# Patient Record
Sex: MALE | Race: WHITE
[De-identification: names, ages, dates, MRNs, and addresses within clinical notes are randomized per-mention and may not be internally consistent; named-entity substitution may affect disease eponyms.]

---

## 2017-01-16 ENCOUNTER — HOSPITAL ENCOUNTER (INPATIENT)
Dept: HOSPITAL 47 - EC | Age: 72
LOS: 7 days | Discharge: NURSING, REHAB OR LONG-TERM CARE | DRG: 314 | End: 2017-01-23
Payer: MEDICARE

## 2017-01-16 VITALS — BODY MASS INDEX: 29 KG/M2

## 2017-01-16 DIAGNOSIS — T80.211A: Primary | ICD-10-CM

## 2017-01-16 DIAGNOSIS — E43: ICD-10-CM

## 2017-01-16 DIAGNOSIS — Z85.118: ICD-10-CM

## 2017-01-16 DIAGNOSIS — Z92.3: ICD-10-CM

## 2017-01-16 DIAGNOSIS — Z79.82: ICD-10-CM

## 2017-01-16 DIAGNOSIS — A41.81: ICD-10-CM

## 2017-01-16 DIAGNOSIS — E83.42: ICD-10-CM

## 2017-01-16 DIAGNOSIS — N17.9: ICD-10-CM

## 2017-01-16 DIAGNOSIS — E83.52: ICD-10-CM

## 2017-01-16 DIAGNOSIS — Z87.891: ICD-10-CM

## 2017-01-16 DIAGNOSIS — Z90.49: ICD-10-CM

## 2017-01-16 DIAGNOSIS — Z74.01: ICD-10-CM

## 2017-01-16 DIAGNOSIS — G92: ICD-10-CM

## 2017-01-16 DIAGNOSIS — L89.152: ICD-10-CM

## 2017-01-16 DIAGNOSIS — J44.9: ICD-10-CM

## 2017-01-16 DIAGNOSIS — Z85.46: ICD-10-CM

## 2017-01-16 DIAGNOSIS — R65.21: ICD-10-CM
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
## 2017-01-16 DIAGNOSIS — E87.6: ICD-10-CM

## 2017-01-16 DIAGNOSIS — J90: ICD-10-CM

## 2017-01-16 DIAGNOSIS — E87.5: ICD-10-CM

## 2017-01-16 DIAGNOSIS — D53.9: ICD-10-CM

## 2017-01-16 DIAGNOSIS — N40.0: ICD-10-CM

## 2017-01-16 DIAGNOSIS — E86.0: ICD-10-CM

## 2017-01-16 DIAGNOSIS — E87.2: ICD-10-CM

## 2017-01-16 DIAGNOSIS — K50.90: ICD-10-CM

## 2017-01-16 DIAGNOSIS — I10: ICD-10-CM

## 2017-01-16 LAB
ALP SERPL-CCNC: 133 U/L (ref 38–126)
ALT SERPL-CCNC: 67 U/L (ref 21–72)
ANION GAP SERPL CALC-SCNC: 14 MMOL/L
APTT BLD: 27.2 SEC (ref 22–30)
AST SERPL-CCNC: 54 U/L (ref 17–59)
BASOPHILS # BLD AUTO: 0 K/UL (ref 0–0.2)
BASOPHILS NFR BLD AUTO: 0 %
BUN SERPL-SCNC: 43 MG/DL (ref 9–20)
CALCIUM SPEC-MCNC: 10.3 MG/DL (ref 8.4–10.2)
CH: 29.9
CHCM: 32
CHLORIDE SERPL-SCNC: 104 MMOL/L (ref 98–107)
CK SERPL-CCNC: <20 U/L (ref 55–170)
CK SERPL-CCNC: <20 U/L (ref 55–170)
CO2 SERPL-SCNC: 21 MMOL/L (ref 22–30)
EOSINOPHIL # BLD AUTO: 0.3 K/UL (ref 0–0.7)
EOSINOPHIL NFR BLD AUTO: 1 %
ERYTHROCYTE [DISTWIDTH] IN BLOOD BY AUTOMATED COUNT: 3.32 M/UL (ref 4.3–5.9)
ERYTHROCYTE [DISTWIDTH] IN BLOOD: 16.6 % (ref 11.5–15.5)
GLUCOSE BLD-MCNC: 105 MG/DL (ref 75–99)
GLUCOSE BLD-MCNC: 140 MG/DL (ref 75–99)
GLUCOSE BLD-MCNC: 84 MG/DL (ref 75–99)
GLUCOSE BLD-MCNC: 98 MG/DL (ref 75–99)
GLUCOSE SERPL-MCNC: 122 MG/DL (ref 74–99)
HCT VFR BLD AUTO: 31.1 % (ref 39–53)
HDW: 3.2
HEMOGLOBIN A1C: 4.9 % (ref 4.2–6.1)
HGB BLD-MCNC: 10.1 GM/DL (ref 13–17.5)
INR PPP: 1.5 (ref ?–1.1)
LUC NFR BLD AUTO: 1 %
LYMPHOCYTES # SPEC AUTO: 0.4 K/UL (ref 1–4.8)
LYMPHOCYTES NFR SPEC AUTO: 2 %
MAGNESIUM SPEC-SCNC: 1.9 MG/DL (ref 1.6–2.3)
MCH RBC QN AUTO: 30.3 PG (ref 25–35)
MCHC RBC AUTO-ENTMCNC: 32.3 G/DL (ref 31–37)
MCV RBC AUTO: 93.7 FL (ref 80–100)
MONOCYTES # BLD AUTO: 0.8 K/UL (ref 0–1)
MONOCYTES NFR BLD AUTO: 4 %
NEUTROPHILS # BLD AUTO: 19.5 K/UL (ref 1.3–7.7)
NEUTROPHILS NFR BLD AUTO: 93 %
NON-AFRICAN AMERICAN GFR(MDRD): >60
PARTICLE COUNT: (no result)
PH UR: 7 [PH] (ref 5–8)
PH UR: 7.5 [PH] (ref 5–8)
PHOSPHATE SERPL-MCNC: 3.3 MG/DL (ref 2.5–4.5)
POTASSIUM SERPL-SCNC: 5.3 MMOL/L (ref 3.5–5.1)
PROT SERPL-MCNC: 7 G/DL (ref 6.3–8.2)
PROT UR QL: (no result)
PT BLD: 14.2 SEC (ref 9–12)
SODIUM SERPL-SCNC: 139 MMOL/L (ref 137–145)
SP GR UR: 1.01 (ref 1–1.03)
SP GR UR: 1.03 (ref 1–1.03)
TRIGL SERPL-MCNC: 63 MG/DL (ref ?–150)
TROPONIN I SERPL-MCNC: <0.012 NG/ML (ref 0–0.03)
TROPONIN I SERPL-MCNC: <0.012 NG/ML (ref 0–0.03)
UA BILLING (MACRO VS. MICRO): (no result)
UA BILLING (MACRO VS. MICRO): (no result)
UROBILINOGEN UR QL STRIP: <2 MG/DL (ref ?–2)
UROBILINOGEN UR QL STRIP: <2 MG/DL (ref ?–2)
WBC # BLD AUTO: 0.12 10*3/UL
WBC # BLD AUTO: 21 K/UL (ref 3.8–10.6)
WBC (PEROX): 22.08

## 2017-01-16 PROCEDURE — 86850 RBC ANTIBODY SCREEN: CPT

## 2017-01-16 PROCEDURE — 80053 COMPREHEN METABOLIC PANEL: CPT

## 2017-01-16 PROCEDURE — 83036 HEMOGLOBIN GLYCOSYLATED A1C: CPT

## 2017-01-16 PROCEDURE — 94760 N-INVAS EAR/PLS OXIMETRY 1: CPT

## 2017-01-16 PROCEDURE — 82550 ASSAY OF CK (CPK): CPT

## 2017-01-16 PROCEDURE — 82553 CREATINE MB FRACTION: CPT

## 2017-01-16 PROCEDURE — 84443 ASSAY THYROID STIM HORMONE: CPT

## 2017-01-16 PROCEDURE — 81003 URINALYSIS AUTO W/O SCOPE: CPT

## 2017-01-16 PROCEDURE — 43246 EGD PLACE GASTROSTOMY TUBE: CPT

## 2017-01-16 PROCEDURE — 82330 ASSAY OF CALCIUM: CPT

## 2017-01-16 PROCEDURE — 74177 CT ABD & PELVIS W/CONTRAST: CPT

## 2017-01-16 PROCEDURE — 83883 ASSAY NEPHELOMETRY NOT SPEC: CPT

## 2017-01-16 PROCEDURE — 82570 ASSAY OF URINE CREATININE: CPT

## 2017-01-16 PROCEDURE — 96367 TX/PROPH/DG ADDL SEQ IV INF: CPT

## 2017-01-16 PROCEDURE — 80202 ASSAY OF VANCOMYCIN: CPT

## 2017-01-16 PROCEDURE — 82306 VITAMIN D 25 HYDROXY: CPT

## 2017-01-16 PROCEDURE — 85730 THROMBOPLASTIN TIME PARTIAL: CPT

## 2017-01-16 PROCEDURE — 87186 SC STD MICRODIL/AGAR DIL: CPT

## 2017-01-16 PROCEDURE — 86900 BLOOD TYPING SEROLOGIC ABO: CPT

## 2017-01-16 PROCEDURE — 87086 URINE CULTURE/COLONY COUNT: CPT

## 2017-01-16 PROCEDURE — 82652 VIT D 1 25-DIHYDROXY: CPT

## 2017-01-16 PROCEDURE — 83970 ASSAY OF PARATHORMONE: CPT

## 2017-01-16 PROCEDURE — 93005 ELECTROCARDIOGRAM TRACING: CPT

## 2017-01-16 PROCEDURE — 87040 BLOOD CULTURE FOR BACTERIA: CPT

## 2017-01-16 PROCEDURE — 87077 CULTURE AEROBIC IDENTIFY: CPT

## 2017-01-16 PROCEDURE — 83735 ASSAY OF MAGNESIUM: CPT

## 2017-01-16 PROCEDURE — 86901 BLOOD TYPING SEROLOGIC RH(D): CPT

## 2017-01-16 PROCEDURE — 84478 ASSAY OF TRIGLYCERIDES: CPT

## 2017-01-16 PROCEDURE — 71020: CPT

## 2017-01-16 PROCEDURE — 85025 COMPLETE CBC W/AUTO DIFF WBC: CPT

## 2017-01-16 PROCEDURE — 85610 PROTHROMBIN TIME: CPT

## 2017-01-16 PROCEDURE — 84484 ASSAY OF TROPONIN QUANT: CPT

## 2017-01-16 PROCEDURE — 82040 ASSAY OF SERUM ALBUMIN: CPT

## 2017-01-16 PROCEDURE — 87070 CULTURE OTHR SPECIMN AEROBIC: CPT

## 2017-01-16 PROCEDURE — 70450 CT HEAD/BRAIN W/O DYE: CPT

## 2017-01-16 PROCEDURE — 80048 BASIC METABOLIC PNL TOTAL CA: CPT

## 2017-01-16 PROCEDURE — 83519 RIA NONANTIBODY: CPT

## 2017-01-16 PROCEDURE — 36415 COLL VENOUS BLD VENIPUNCTURE: CPT

## 2017-01-16 PROCEDURE — 96365 THER/PROPH/DIAG IV INF INIT: CPT

## 2017-01-16 PROCEDURE — 99291 CRITICAL CARE FIRST HOUR: CPT

## 2017-01-16 PROCEDURE — 81001 URINALYSIS AUTO W/SCOPE: CPT

## 2017-01-16 PROCEDURE — 83605 ASSAY OF LACTIC ACID: CPT

## 2017-01-16 PROCEDURE — 84100 ASSAY OF PHOSPHORUS: CPT

## 2017-01-16 PROCEDURE — 84132 ASSAY OF SERUM POTASSIUM: CPT

## 2017-01-16 PROCEDURE — 71010: CPT

## 2017-01-16 PROCEDURE — 84156 ASSAY OF PROTEIN URINE: CPT

## 2017-01-16 PROCEDURE — 86920 COMPATIBILITY TEST SPIN: CPT

## 2017-01-16 PROCEDURE — 99153 MOD SED SAME PHYS/QHP EA: CPT

## 2017-01-16 RX ADMIN — INSULIN LISPRO SCH: 100 INJECTION, SOLUTION INTRAVENOUS; SUBCUTANEOUS at 18:42

## 2017-01-16 RX ADMIN — CEFAZOLIN SCH MLS/HR: 330 INJECTION, POWDER, FOR SOLUTION INTRAMUSCULAR; INTRAVENOUS at 07:25

## 2017-01-16 RX ADMIN — CEFAZOLIN SCH MLS/HR: 330 INJECTION, POWDER, FOR SOLUTION INTRAMUSCULAR; INTRAVENOUS at 18:14

## 2017-01-16 RX ADMIN — DEXTROSE MONOHYDRATE SCH MLS/HR: 5 INJECTION, SOLUTION INTRAVENOUS at 12:58

## 2017-01-16 RX ADMIN — HEPARIN SODIUM SCH UNIT: 5000 INJECTION, SOLUTION INTRAVENOUS; SUBCUTANEOUS at 18:14

## 2017-01-16 RX ADMIN — NICARDIPINE HYDROCHLORIDE SCH MLS/HR: 2.5 INJECTION INTRAVENOUS at 05:28

## 2017-01-16 RX ADMIN — HEPARIN SODIUM SCH UNIT: 5000 INJECTION, SOLUTION INTRAVENOUS; SUBCUTANEOUS at 23:38

## 2017-01-16 RX ADMIN — SODIUM CHLORIDE SCH MLS/HR: 900 INJECTION, SOLUTION INTRAVENOUS at 22:46

## 2017-01-16 RX ADMIN — DEXTROSE MONOHYDRATE SCH MLS/HR: 5 INJECTION, SOLUTION INTRAVENOUS at 23:37

## 2017-01-16 RX ADMIN — INSULIN LISPRO SCH: 100 INJECTION, SOLUTION INTRAVENOUS; SUBCUTANEOUS at 11:53

## 2017-01-16 RX ADMIN — SODIUM CHLORIDE SCH MLS/HR: 9 INJECTION, SOLUTION INTRAVENOUS at 23:37

## 2017-01-16 RX ADMIN — DEXTROSE MONOHYDRATE SCH MLS/HR: 5 INJECTION, SOLUTION INTRAVENOUS at 16:15

## 2017-01-16 RX ADMIN — CEFAZOLIN SCH MLS/HR: 330 INJECTION, POWDER, FOR SOLUTION INTRAMUSCULAR; INTRAVENOUS at 23:42

## 2017-01-16 RX ADMIN — PANTOPRAZOLE SODIUM SCH MG: 40 INJECTION, POWDER, FOR SOLUTION INTRAVENOUS at 08:47

## 2017-01-16 RX ADMIN — NICARDIPINE HYDROCHLORIDE SCH MLS/HR: 2.5 INJECTION INTRAVENOUS at 05:38

## 2017-01-16 NOTE — XR
EXAMINATION TYPE: XR chest 2V

 

DATE OF EXAM: 1/16/2017 6:03 AM

 

COMPARISON: 12/13/2016 

HISTORY: Fever

 

TECHNIQUE:  Frontal and lateral views of the chest are obtained.

 

FINDINGS:  There appears to be some pleural thickening on the left posterior chest wall. There is no 
heart failure. There is a right central venous catheter with tip in the superior vena cava. Right ceasar
g is clear.

 

IMPRESSION:  Left pleural effusion is the same or improved compared to last exam. Osteopenia.

## 2017-01-16 NOTE — CT
EXAMINATION TYPE: CT abdomen pelvis w con

 

DATE OF EXAM: 1/16/2017 6:20 AM

 

COMPARISON: 11/29/2016

 

HISTORY: Fever

 

CT DLP: 570.50 mGycm

Automated exposure control for dose reduction was used.

 

TECHNIQUE:  Helical acquisition of images was performed from the lung bases through the pelvis.

 

CONTRAST: 

Performed without Oral Contrast and with IV Contrast, patient injected with 100 mL of Omnipaque 300.

 

FINDINGS: 

 

Multiple axial sections were obtained from the diaphragm to the floor the pelvis with intravenous con
trast.: 

 

There is a left pleural effusion. The right lung base is clear.

 

Liver spleen pancreas appear normal. Bile ducts are not dilated. Gallbladder is contracted.

 

There is an ileostomy in the right midabdomen. There is some skin deformity of the left mid abdomen c
onsistent with previous stoma. There is no evidence of bowel obstruction. There is increased density 
in the dependent urinary bladder that could be debris.

 

There is no retroperitoneal adenopathy.

 

Kidneys show satisfactory contrast opacification is no hydronephrosis. There is no adrenal mass.

 

IMPRESSION: 

THERE IS CHRONIC LEFT PLEURAL EFFUSION WITHOUT CHANGE COMPARED TO OLD EXAM. THERE IS CLEARING OF RIGH
T PLEURAL FLUID COMPARED TO LAST EXAM.

 

THERE IS AN ILEOSTOMY IN THE RIGHT MID ABDOMEN THAT IS NEW COMPARED TO THE OLD EXAM. NO BOWEL OBSTRUC
TION. THERE IS REMOVAL OF THE APPARENT COLOSTOMY IN THE LEFT MID ABDOMEN COMPARED TO OLD EXAM.

 

THERE ARE MILD OSTEOPOROTIC TYPE COMPRESSION FRACTURES OF THE LUMBAR SPINE AT L2 T12 LEVELS. THIS IS 
UNCHANGED.

 

MILD INCREASED DENSITY IN THE DEPENDENT URINARY BLADDER COULD BE HEMORRHAGE OR DEBRIS AND SHOULD BE C
ORRELATED CLINICALLY.

 

THE DELAYED IMAGES OF THE KIDNEYS DO NOT SHOW ANY SIGNIFICANT CONTRAST IN THE COLLECTING SYSTEMS AND 
THIS MAY INDICATE RENAL FAILURE.
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
## 2017-01-16 NOTE — XR
EXAMINATION TYPE: XR chest 1V portable

 

DATE OF EXAM: 1/16/2017 5:58 PM

 

COMPARISON: Today

 

HISTORY: Line placement

 

TECHNIQUE: Single frontal view of the chest is obtained.

 

FINDINGS:  There is a right jugular catheter with the tip in the right atrium. There is no pneumothor
ax. Heart size is normal. There is no heart failure.

 

IMPRESSION:  Right jugular catheter appears in good position.

## 2017-01-16 NOTE — CT
EXAMINATION TYPE: CT brain wo con

 

DATE OF EXAM: 1/16/2017 7:49 AM

 

HISTORY: Change in mental status

 

CT DLP: 1012.70 mGycm.  Automated Exposure Control for Dose Reduction was Utilized.

 

TECHNIQUE: CT scan of the head is performed without contrast.

 

COMPARISON: CT brain December 13, 2016

 

FINDINGS:   There is no acute intracranial hemorrhage or midline shift identified. There is diffuse v
entricular and sulcal prominence consistent with diffuse age-related cerebral atrophy.  There is low-
attenuation in the periventricular white matter consistent with chronic small vessel ischemic change.
 Slightly hyperdense vascular structures as well as filling draining venous sinuses are product of re
cent IV contrast injection for abdominal CT performed earlier today. Some scleral calcifications are 
present in both globes. Paranasal sinuses are clear.

 

IMPRESSION:  No acute intracranial hemorrhage or midline shift.  There is moderate diffuse age-relate
d cerebral atrophy and chronic small vessel ischemic change redemonstrated. No significant change fro
m prior CT is identified.

## 2017-01-16 NOTE — P.PCN
Date of Procedure: 01/16/17


Preoperative Diagnosis: 


Septic shock, hemodynamic instability


Postoperative Diagnosis: 


same


Procedure(s) Performed: 


right radial arterial line


Anesthesia: local


Surgeon: Tasha Bryant


Pathology: none sent


Condition: critical


Disposition: ICU


Indications for Procedure: 


Septic shock, hemodynamic monitoring, need for vasopressors


Description of Procedure: 


Alans test performed.  Local anesthesia utilized.  Skin cleaned with 

chloraprep.  Pulse palpated and needle inserted into artery with return of 

bright red pulsatile blood.  Guide wire fed and catheter fed over guide wire.  

Catheter sutured into place.  Patient tolerated procedure well.
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
## 2017-01-17 LAB
ALP SERPL-CCNC: 108 U/L (ref 38–126)
ALT SERPL-CCNC: 51 U/L (ref 21–72)
ANION GAP SERPL CALC-SCNC: 8 MMOL/L
AST SERPL-CCNC: 27 U/L (ref 17–59)
BASOPHILS # BLD AUTO: 0 K/UL (ref 0–0.2)
BASOPHILS NFR BLD AUTO: 0 %
BUN SERPL-SCNC: 28 MG/DL (ref 9–20)
CALCIUM SPEC-MCNC: 8.7 MG/DL (ref 8.4–10.2)
CH: 29.1
CHCM: 29.5
CHLORIDE SERPL-SCNC: 116 MMOL/L (ref 98–107)
CO2 SERPL-SCNC: 18 MMOL/L (ref 22–30)
EOSINOPHIL # BLD AUTO: 0 K/UL (ref 0–0.7)
EOSINOPHIL NFR BLD AUTO: 0 %
ERYTHROCYTE [DISTWIDTH] IN BLOOD BY AUTOMATED COUNT: 2.3 M/UL (ref 4.3–5.9)
ERYTHROCYTE [DISTWIDTH] IN BLOOD: 16.4 % (ref 11.5–15.5)
GLUCOSE BLD-MCNC: 79 MG/DL (ref 75–99)
GLUCOSE BLD-MCNC: 86 MG/DL (ref 75–99)
GLUCOSE BLD-MCNC: 88 MG/DL (ref 75–99)
GLUCOSE BLD-MCNC: 97 MG/DL (ref 75–99)
GLUCOSE SERPL-MCNC: 71 MG/DL (ref 74–99)
HCT VFR BLD AUTO: 22.8 % (ref 39–53)
HDW: 3.07
HGB BLD-MCNC: 6.8 GM/DL (ref 13–17.5)
LUC NFR BLD AUTO: 2 %
LYMPHOCYTES # SPEC AUTO: 0.5 K/UL (ref 1–4.8)
LYMPHOCYTES NFR SPEC AUTO: 5 %
MAGNESIUM SPEC-SCNC: 1.8 MG/DL (ref 1.6–2.3)
MCH RBC QN AUTO: 29.5 PG (ref 25–35)
MCHC RBC AUTO-ENTMCNC: 29.8 G/DL (ref 31–37)
MCV RBC AUTO: 99 FL (ref 80–100)
MONOCYTES # BLD AUTO: 0.7 K/UL (ref 0–1)
MONOCYTES NFR BLD AUTO: 7 %
NEUTROPHILS # BLD AUTO: 8.9 K/UL (ref 1.3–7.7)
NEUTROPHILS NFR BLD AUTO: 87 %
NON-AFRICAN AMERICAN GFR(MDRD): >60
PHOSPHATE SERPL-MCNC: 3.9 MG/DL (ref 2.5–4.5)
POTASSIUM SERPL-SCNC: 3.9 MMOL/L (ref 3.5–5.1)
PROT SERPL-MCNC: 4.4 G/DL (ref 6.3–8.2)
SODIUM SERPL-SCNC: 142 MMOL/L (ref 137–145)
WBC # BLD AUTO: 0.2 10*3/UL
WBC # BLD AUTO: 10.3 K/UL (ref 3.8–10.6)
WBC (PEROX): 10.59

## 2017-01-17 RX ADMIN — INSULIN LISPRO SCH: 100 INJECTION, SOLUTION INTRAVENOUS; SUBCUTANEOUS at 00:15

## 2017-01-17 RX ADMIN — INSULIN LISPRO SCH: 100 INJECTION, SOLUTION INTRAVENOUS; SUBCUTANEOUS at 05:54

## 2017-01-17 RX ADMIN — CEFAZOLIN SCH: 330 INJECTION, POWDER, FOR SOLUTION INTRAMUSCULAR; INTRAVENOUS at 11:12

## 2017-01-17 RX ADMIN — PANTOPRAZOLE SODIUM SCH MG: 40 INJECTION, POWDER, FOR SOLUTION INTRAVENOUS at 08:08

## 2017-01-17 RX ADMIN — DEXTROSE MONOHYDRATE SCH MLS/HR: 5 INJECTION, SOLUTION INTRAVENOUS at 23:17

## 2017-01-17 RX ADMIN — SODIUM CHLORIDE SCH MLS/HR: 9 INJECTION, SOLUTION INTRAVENOUS at 07:54

## 2017-01-17 RX ADMIN — INSULIN LISPRO SCH: 100 INJECTION, SOLUTION INTRAVENOUS; SUBCUTANEOUS at 12:08

## 2017-01-17 RX ADMIN — CEFAZOLIN SCH: 330 INJECTION, POWDER, FOR SOLUTION INTRAMUSCULAR; INTRAVENOUS at 11:13

## 2017-01-17 RX ADMIN — SODIUM CHLORIDE SCH MLS/HR: 9 INJECTION, SOLUTION INTRAVENOUS at 08:53

## 2017-01-17 RX ADMIN — HEPARIN SODIUM SCH UNIT: 5000 INJECTION, SOLUTION INTRAVENOUS; SUBCUTANEOUS at 23:17

## 2017-01-17 RX ADMIN — MAGNESIUM SULFATE IN DEXTROSE SCH MLS/HR: 10 INJECTION, SOLUTION INTRAVENOUS at 08:53

## 2017-01-17 RX ADMIN — MAGNESIUM SULFATE IN DEXTROSE SCH MLS/HR: 10 INJECTION, SOLUTION INTRAVENOUS at 07:51

## 2017-01-17 RX ADMIN — SODIUM CHLORIDE SCH MLS/HR: 9 INJECTION, SOLUTION INTRAVENOUS at 15:39

## 2017-01-17 RX ADMIN — CEFAZOLIN SCH MLS/HR: 330 INJECTION, POWDER, FOR SOLUTION INTRAMUSCULAR; INTRAVENOUS at 23:17

## 2017-01-17 RX ADMIN — DEXTROSE MONOHYDRATE SCH MLS/HR: 5 INJECTION, SOLUTION INTRAVENOUS at 08:07

## 2017-01-17 RX ADMIN — INSULIN LISPRO SCH: 100 INJECTION, SOLUTION INTRAVENOUS; SUBCUTANEOUS at 17:54

## 2017-01-17 RX ADMIN — CEFAZOLIN SCH MLS/HR: 330 INJECTION, POWDER, FOR SOLUTION INTRAMUSCULAR; INTRAVENOUS at 08:08

## 2017-01-17 RX ADMIN — HEPARIN SODIUM SCH UNIT: 5000 INJECTION, SOLUTION INTRAVENOUS; SUBCUTANEOUS at 08:08

## 2017-01-17 RX ADMIN — DEXTROSE MONOHYDRATE SCH MLS/HR: 5 INJECTION, SOLUTION INTRAVENOUS at 15:39

## 2017-01-17 RX ADMIN — CEFAZOLIN SCH MLS/HR: 330 INJECTION, POWDER, FOR SOLUTION INTRAMUSCULAR; INTRAVENOUS at 16:59

## 2017-01-17 RX ADMIN — HEPARIN SODIUM SCH UNIT: 5000 INJECTION, SOLUTION INTRAVENOUS; SUBCUTANEOUS at 15:34

## 2017-01-17 RX ADMIN — SODIUM CHLORIDE SCH MLS/HR: 900 INJECTION, SOLUTION INTRAVENOUS at 21:04

## 2017-01-17 NOTE — CONS
DATE OF CONSULTATION:  2017 



Reason for consultation is sepsis. 



HISTORY OF PRESENT ILLNESS: The patient is a 71-year-old  

male, well known to my service. The patient recently did have MSSA and 

bacteremia for which the patient did have extensive work-up including 

a GALO, a chest CT, CT of the abdomen and pelvis.  Only positive 

finding was severe colitis with ulcerative colitis exacerbation 

failing medical therapy.  Patient ended up having a subtotal colectomy 

and ileostomy formation. The patient also had an episode of candidemia 

thought to be related to his abdominal source.  followup blood 

cultures were negative. Initially identified as candida famata that 

was later switched to Candida tropicalis. The patient was discharged 

to select specialty and I did have a discussion with that physician 

and suggested the patient should get the p.o. Diflucan for another 

week or 10 days to finish a course of therapy.  The patient ended up 

getting a PICC line at that facility and apparently has been treated 

with the TPN. [place] [facility] 

[place] has been there for about 10 

days now.  Patient was  sent to the [facility] last 

night with the chief complaints of fever just prior to arrival to the 

hospital.  Patient did receive dose of Tylenol before coming to the 

hospital. The patient was subsequently evaluated by the ER physician.  

He did have a chest x-ray negative for any pneumonia. He did have a CT 

of abdomen and pelvis which shows chronic left pleural effusion, 

ileostomy, lumbar spine but no evidence of 

an abscess. The patient did have UA that has been negative. The 

patient was hypotensive, requiring multiple fluid boluses. The patient 

was started on the IV antibiotics and was admitted to the ICU and I was asked 

to see the patient for further recommendation regarding antibiotic 

therapy.  Patient denies any significant headache or URI symptoms. 

Denies significant chest pain. Occasional cough. No significant 

abdominal pain. Did have some liquid stool in the ileostomy bag but no 

worsening and no burning or frequency of urine.  



REVIEW OF SYSTEMS: 

CONSTITUTIONAL: Positive for weakness and fever. 

EYES: No complaint. 

ENT: No complaint. 

RESPIRATORY: No aspiration. 

CARDIOVASCULAR:  No complaint.

GENITOURINARY:  No complaint. 

GASTROINTESTINAL:  No complaint.

MUSCULOSKELETAL:  No complaint.

INTEGUMENTARY:  No complaint. 

PSYCHOLOGIC: No complaint. 

ENDOCRINE: No complaint. 

NEUROLOGIC:  No complaint.  



PAST MEDICAL HISTORY: Significant for ulcerative colitis, COPD, 

prostate cancer,  PICC line related to candidemia and MSSA bacteremia. 

 



PAST SURGICAL HISTORY: Adenoidectomy, appendectomy, cholecystectomy, 

hernia repair,  

Tonsillectomy and subtotal colectomy, left upper lobe infection for 

the lung cancer.  



SOCIAL HISTORY: Remote history of smoking, quit back in 2015. 

Did have a history of heavy drinking and quit 10 years ago. No drug 

use.  



FAMILY HISTORY: Father with history of osteoarthritis. Mother with a 

history of dementia,  at the age of 82.   



ALLERGIES: No known drug allergies. 



Medications currently include the patient is on heparin, Humalog, 

Narcan, norepinephrine, Protonix, vanco and Levaquin.  



On examination, blood pressure 94/54 with a pulse of 113, temperature 

of 98.2, T-max of 102.6. He is 98% on room air.  

General description is an elderly male, lying in bed in no distress. 

No tachypnea or accessory muscle for respiration use.  

HEENT examination shows pallor. There is no scleral icterus.  Oral 

mucous membranes dry.  

NECK: Trachea central, though no thyromegaly. 

LUNGS: Unlabored breathing. Some decreased breath sounds at the base. 

No wheeze.  

HEART: S1, S2, tachycardic. 

ABDOMEN: Soft, no tenderness. No guarding or rigidity.  Ileostomy with 

some liquidy stool, no blood. 

EXTREMITIES: No edema of the feet. 

SKIN EXAMINATION: No rash or mass palpable. 

NEUROLOGICAL:  Patient is awake, alert, oriented x2. Mood and affect 

normal.  



LABS: Hemoglobin is 10.1, white count of 21,000. BUN of 43, creatinine 

1.02, potassium is 5.3, lactic acid 2.6, liver enzymes are normal. UA 

has been negative. Chest x-rays were negative for any pneumonia.  



DIAGNOSTIC IMPRESSION AND PLAN: Patient with sepsis in a patient who 

did have fever of 102.6 with hypotension requiring multiple fluid 

boluses, currently on the pressor support.  Elevated lactic acid, 

source is likely a PICC line infection which apparently has been 

placed at the [facility] hospital and no other clinical focus of 

infection. Chest x-ray negative for any pneumonia.  CT abd is negative 

for any abscess and UA has been negative in a patient who did have 

previous history of methicillin-susceptible Staphylococcus aureus 

bacteremia as well as candida.  



PLAN:  

1. Discontinue the PICC line and send tip for culture.

2. Vancomycin to continue, add Zosyn for the gram-negative and will 

add micafungin as the patient has been exposed to the fluconazole 

3. Will follow up on the clinical condition and cultures to further 

adjust the medication as needed.  



Thank you for this consultation. Will follow this patient along with 

you.  



NICOLLE

## 2017-01-17 NOTE — P.PN
Subjective


Principal diagnosis: 





Septic shock





Patient seen and examined in the ICU with nursing staff at bedside. Patient 

states he is feeling much better today. He denies any chest pain or shortness 

of breath. The patient has been off of pressors since 1 AM. He has adequate 

urine output and greater than 50 mL an hour. His current CVP is 8. The patient'

s hemoglobin did drop to 6.8. He is currently receiving 1 unit of packed red 

blood cells. He has not been febrile overnight.





Objective





- Vital Signs


Vital signs: 


 Vital Signs











Temp  97.7 F   01/17/17 16:00


 


Pulse  95   01/17/17 16:00


 


Resp  19   01/17/17 16:00


 


BP  116/65   01/17/17 16:00


 


Pulse Ox  99   01/17/17 16:00








 Intake & Output











 01/16/17 01/17/17 01/17/17





 18:59 06:59 18:59


 


Intake Total 3363.293 2533.355 2585.0


 


Output Total 545 1340 725


 


Balance 2818.293 5730.012 2888.0


 


Weight 81.647 kg 71.6 kg 


 


Intake:   


 


  IV 3125.0 2380.0 1160.0


 


    Fluconazole in NaCl,Iso- 200  





    Osm 400 mg In Saline 200   





    200ml.bag @ 100 mls/hr   





    IVPB DAILY MAYA Rx#:   





    [id number]   


 


    Piperacillin-Tazobactam 3 75.0 50.0 50.0





    .375 gm In Dextrose/Water   





    1 50ml.bag @ 12.5 mls/hr   





    IVPB Q8HR MAYA Rx#:   





    [id number]   


 


    Sodium Chloride 0.9% 1, 600 1080 1110





    000 ml @ 150 mls/hr IV .   





    Q6H40M MAYA Rx#:[id number]   


 


    Sodium Chloride 0.9% 1, 2000  





    000 ml @ 999 mls/hr IV .   





    Q1H1M ONE Rx#:[id number]   


 


    Sodium Chloride 0.9% 1,  1000 





    000 ml @ 999 mls/hr IV .   





    Q1H1M STA Rx#:[id number]   


 


    Vancomycin 1,500 mg In 250 250 





    Sodium Chloride 0.9% 250   





    ml @ 125 mls/hr IVPB ONCE   





    ONE Rx#:[id number]   


 


  Intake, IV Titration 2.293 143.355 400





  Amount   


 


    Magnesium Sulfate-D5w Pmx   200





    1 gm In Dextrose/Water 1   





    100ml.bag @ 100 mls/hr   





    IVPB Q1H MAYA Rx#:   





    [id number]   


 


    Micafungin 100 mg In  100 





    Sodium Chloride 0.9% 100   





    ml @ 100 mls/hr IVPB Q24H   





    MAYA Rx#:[id number]   


 


    Norepinephrine 16 mg In 2.293 43.355 





    Sodium Chloride 0.9% 250   





    ml @ Titrate IV .Q0M MAYA   





    Rx#:[id number]   


 


    Potassium Chloride 10 meq   200





    Lidocaine 2% Inj 10 mg   





    In Sodium Chloride 0.9%   





    100 ml @ 100 mls/hr IV   





    Q1HR MAYA Rx#:[id number]   


 


  Oral 236  325


 


  Tube Feeding  10 


 


  Blood Product   700


 


    Rc Cpda-1  Unit   250





    [id number]   


 


Output:   


 


  Urine 545 640 525


 


  Stool  700 200


 


Other:   


 


  Voiding Method Indwelling Catheter Indwelling Catheter Indwelling Catheter








 ABP, PAP, CO, CI - Last Documented











Arterial Blood Pressure        93/39

















- Exam





Gen.: Patient is alert however he is somewhat confused


Cardiovascular: Regular rate and rhythm, S1/S2


Lungs: Diminished at the bases otherwise clear


Abdomen: Soft nontender nondistended positive bowel sounds, colostomy in place


Extremities: No edema





- Labs


CBC & Chem 7: 


 01/17/17 04:09





 01/17/17 04:09


Labs: 


 Abnormal Lab Results - Last 24 Hours (Table)











  01/17/17 01/17/17 01/17/17 Range/Units





  04:09 04:09 05:35 


 


RBC  2.30 L    (4.30-5.90)  m/uL


 


Hgb  6.8 L* D    (13.0-17.5)  gm/dL


 


Hct  22.8 L    (39.0-53.0)  %


 


MCHC  29.8 L    (31.0-37.0)  g/dL


 


RDW  16.4 H    (11.5-15.5)  %


 


Neutrophils #  8.9 H    (1.3-7.7)  k/uL


 


Lymphocytes #  0.5 L    (1.0-4.8)  k/uL


 


Chloride   116 H   ()  mmol/L


 


Carbon Dioxide   18 L   (22-30)  mmol/L


 


BUN   28 H   (9-20)  mg/dL


 


Glucose   71 L   (74-99)  mg/dL


 


Ionized Calcium Alexis   6.0 H*  6.1 H*  (4.5-5.3)  mg/dL


 


Total Protein   4.4 L   (6.3-8.2)  g/dL


 


Albumin   1.9 L   (3.5-5.0)  g/dL


 


Crossmatch     














  01/17/17 Range/Units





  07:15 


 


RBC   (4.30-5.90)  m/uL


 


Hgb   (13.0-17.5)  gm/dL


 


Hct   (39.0-53.0)  %


 


MCHC   (31.0-37.0)  g/dL


 


RDW   (11.5-15.5)  %


 


Neutrophils #   (1.3-7.7)  k/uL


 


Lymphocytes #   (1.0-4.8)  k/uL


 


Chloride   ()  mmol/L


 


Carbon Dioxide   (22-30)  mmol/L


 


BUN   (9-20)  mg/dL


 


Glucose   (74-99)  mg/dL


 


Ionized Calcium Alexis   (4.5-5.3)  mg/dL


 


Total Protein   (6.3-8.2)  g/dL


 


Albumin   (3.5-5.0)  g/dL


 


Crossmatch  See Detail  








 Microbiology - Last 24 Hours (Table)











 01/16/17 10:31 Blood Culture Gram Stain - Preliminary





 Blood Blood Culture - Preliminary





    Group D Enterococcus


 


 01/16/17 13:45 Catheter Tip Culture - Preliminary





 Picc Line    Group D Enterococcus


 


 01/16/17 10:31 Blood Culture - Preliminary





 Blood 


 


 01/16/17 11:35 Urine Culture - Preliminary





 Urine,Catheterized 














Assessment and Plan


Plan: 





Septic shock


Bacteremia with enterococcus


Recent fungemia and bacteremia, suspect recurrence


Toxic metabolic encephalopathy


History of Crohn's disease status post colectomy/ileostomy


History of lung cancer status post resection


Anemia


Hypercalcemia


Mild hyperkalemia


Mild non-anion gap metabolic acidosis


Acute kidney injury


Severe protein calorie malnutrition requiring TPN


Generalized weakness


Dehydration


History of upper GI bleed


History of compression fractures





O2 to maintain saturation greater than equal to 88%


IV fluid resuscitation


Remove PICC line per infectious disease


Consult dietitian for PPN


Encourage enteral nutrition


Monitor electrolytes


Place Gordillo catheter


GI and DVT prophylaxis


Antibiotics: Vanco, Zosyn


Antifungals: Fluconazole


Blood, urine cultures - Per nursing gordillo placed and repeat urine culture as 

the urine is turbid


Follow chest x-ray


Transfuse 1 unit of packed red blood cells


Nephrology consult 


Consult PT and OT


if patient's nutrition intake doesn't improve the patient may need PEG tube 

placement. This is discussed with his wife who is agreeable.
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
## 2017-01-17 NOTE — PN
DATE OF SERVICE: 01/17/2017



REASON FOR FOLLOWUP: Sepsis, line-related, and bacteremia. 



INTERVAL HISTORY: The patient is afebrile. He is hemodynamically 

stable and has been taken off the pressors since 1:00 last night. The 

patient denies having any chest pain, cough. No abdominal pain or any 

nausea, vomiting.  



On examination, blood pressure is 118/66 with pulse of 89, temperature 

97.7. He is 100% on room air. General description is an elderly male, 

lying in bed in no distress.  

RESPIRATORY SYSTEM: Unlabored breathing. Clear to auscultation 

anteriorly.  

HEART: S1, S2 regular. 

ABDOMEN: Soft. No tenderness.



LABS: Hemoglobin is 6.8, white count 10 0.3 with a BUN of 28, 

creatinine 1.00. Blood culture showing Enterococcus species. Catheter 

with Enterococcus.  



DIAGNOSTIC IMPRESSION AND PLAN: Patient with sepsis. Source is 

line-related. Percutaneously-inserted central catheter line has been 

discontinued. Catheter as well as blood culture showing a group D 

Enterococcus. Will wait for the sensitivity testing before any 

further adjustment in antibiotics and if no yeast is grown, will discontinue 
micafungin. Overall 

prognosis continues to be guarded. Continue with supportive care.  



MTDD

## 2017-01-18 LAB
1,25(OH)2D3 SERPL-MCNC: 11 PG/ML (ref 20–79)
ANION GAP SERPL CALC-SCNC: 8 MMOL/L
BASOPHILS # BLD AUTO: 0 K/UL (ref 0–0.2)
BASOPHILS NFR BLD AUTO: 0 %
BUN SERPL-SCNC: 16 MG/DL (ref 9–20)
CALCIUM SPEC-MCNC: 8.8 MG/DL (ref 8.4–10.2)
CH: 29.3
CHCM: 30.3
CHLORIDE SERPL-SCNC: 117 MMOL/L (ref 98–107)
CO2 SERPL-SCNC: 17 MMOL/L (ref 22–30)
EOSINOPHIL # BLD AUTO: 0.2 K/UL (ref 0–0.7)
EOSINOPHIL NFR BLD AUTO: 3 %
ERYTHROCYTE [DISTWIDTH] IN BLOOD BY AUTOMATED COUNT: 2.84 M/UL (ref 4.3–5.9)
ERYTHROCYTE [DISTWIDTH] IN BLOOD: 16.2 % (ref 11.5–15.5)
GLUCOSE BLD-MCNC: 103 MG/DL (ref 75–99)
GLUCOSE BLD-MCNC: 110 MG/DL (ref 75–99)
GLUCOSE BLD-MCNC: 79 MG/DL (ref 75–99)
GLUCOSE BLD-MCNC: 91 MG/DL (ref 75–99)
GLUCOSE SERPL-MCNC: 80 MG/DL (ref 74–99)
HCT VFR BLD AUTO: 27.7 % (ref 39–53)
HDW: 3.52
HGB BLD-MCNC: 8.6 GM/DL (ref 13–17.5)
KAPPA LC FREE SER-MCNC: 4.71 MG/DL (ref 0.33–1.94)
LUC NFR BLD AUTO: 4 %
LYMPHOCYTES # SPEC AUTO: 0.7 K/UL (ref 1–4.8)
LYMPHOCYTES NFR SPEC AUTO: 11 %
MAGNESIUM SPEC-SCNC: 1.9 MG/DL (ref 1.6–2.3)
MCH RBC QN AUTO: 30.2 PG (ref 25–35)
MCHC RBC AUTO-ENTMCNC: 31 G/DL (ref 31–37)
MCV RBC AUTO: 97.4 FL (ref 80–100)
MONOCYTES # BLD AUTO: 0.5 K/UL (ref 0–1)
MONOCYTES NFR BLD AUTO: 9 %
NEUTROPHILS # BLD AUTO: 4.5 K/UL (ref 1.3–7.7)
NEUTROPHILS NFR BLD AUTO: 73 %
NON-AFRICAN AMERICAN GFR(MDRD): >60
PHOSPHATE SERPL-MCNC: 3.6 MG/DL (ref 2.5–4.5)
POTASSIUM SERPL-SCNC: 3.4 MMOL/L (ref 3.5–5.1)
SODIUM SERPL-SCNC: 142 MMOL/L (ref 137–145)
WBC # BLD AUTO: 0.25 10*3/UL
WBC # BLD AUTO: 6.1 K/UL (ref 3.8–10.6)
WBC (PEROX): 6.51

## 2017-01-18 RX ADMIN — CEFAZOLIN SCH MLS/HR: 330 INJECTION, POWDER, FOR SOLUTION INTRAMUSCULAR; INTRAVENOUS at 23:22

## 2017-01-18 RX ADMIN — AMPICILLIN SCH MLS/HR: 2 INJECTION, POWDER, FOR SOLUTION INTRAVENOUS at 16:57

## 2017-01-18 RX ADMIN — INSULIN LISPRO SCH: 100 INJECTION, SOLUTION INTRAVENOUS; SUBCUTANEOUS at 05:59

## 2017-01-18 RX ADMIN — INSULIN LISPRO SCH: 100 INJECTION, SOLUTION INTRAVENOUS; SUBCUTANEOUS at 12:13

## 2017-01-18 RX ADMIN — CEFAZOLIN SCH MLS/HR: 330 INJECTION, POWDER, FOR SOLUTION INTRAMUSCULAR; INTRAVENOUS at 12:13

## 2017-01-18 RX ADMIN — AMPICILLIN SCH MLS/HR: 2 INJECTION, POWDER, FOR SOLUTION INTRAVENOUS at 21:39

## 2017-01-18 RX ADMIN — MAGNESIUM SULFATE IN DEXTROSE SCH MLS/HR: 10 INJECTION, SOLUTION INTRAVENOUS at 09:38

## 2017-01-18 RX ADMIN — AMPICILLIN SCH MLS/HR: 2 INJECTION, POWDER, FOR SOLUTION INTRAVENOUS at 23:22

## 2017-01-18 RX ADMIN — MAGNESIUM SULFATE IN DEXTROSE SCH MLS/HR: 10 INJECTION, SOLUTION INTRAVENOUS at 07:28

## 2017-01-18 RX ADMIN — CALCITONIN SALMON SCH UNIT: 200 INJECTION, SOLUTION INTRAMUSCULAR; SUBCUTANEOUS at 13:21

## 2017-01-18 RX ADMIN — INSULIN LISPRO SCH: 100 INJECTION, SOLUTION INTRAVENOUS; SUBCUTANEOUS at 17:53

## 2017-01-18 RX ADMIN — PANTOPRAZOLE SODIUM SCH MG: 40 TABLET, DELAYED RELEASE ORAL at 07:30

## 2017-01-18 RX ADMIN — HEPARIN SODIUM SCH UNIT: 5000 INJECTION, SOLUTION INTRAVENOUS; SUBCUTANEOUS at 16:57

## 2017-01-18 RX ADMIN — SODIUM CHLORIDE SCH MLS/HR: 9 INJECTION, SOLUTION INTRAVENOUS at 08:44

## 2017-01-18 RX ADMIN — DEXTROSE MONOHYDRATE SCH MLS/HR: 5 INJECTION, SOLUTION INTRAVENOUS at 07:44

## 2017-01-18 RX ADMIN — CITALOPRAM HYDROBROMIDE SCH: 20 TABLET ORAL at 22:50

## 2017-01-18 RX ADMIN — TAMSULOSIN HYDROCHLORIDE SCH: 0.4 CAPSULE ORAL at 22:50

## 2017-01-18 RX ADMIN — POTASSIUM CHLORIDE SCH MLS/HR: 7.46 INJECTION, SOLUTION INTRAVENOUS at 14:16

## 2017-01-18 RX ADMIN — INSULIN LISPRO SCH: 100 INJECTION, SOLUTION INTRAVENOUS; SUBCUTANEOUS at 01:48

## 2017-01-18 RX ADMIN — AMPICILLIN SCH MLS/HR: 2 INJECTION, POWDER, FOR SOLUTION INTRAVENOUS at 12:12

## 2017-01-18 RX ADMIN — HEPARIN SODIUM SCH UNIT: 5000 INJECTION, SOLUTION INTRAVENOUS; SUBCUTANEOUS at 07:30

## 2017-01-18 RX ADMIN — HEPARIN SODIUM SCH UNIT: 5000 INJECTION, SOLUTION INTRAVENOUS; SUBCUTANEOUS at 23:25

## 2017-01-18 RX ADMIN — CEFAZOLIN SCH MLS/HR: 330 INJECTION, POWDER, FOR SOLUTION INTRAMUSCULAR; INTRAVENOUS at 07:29

## 2017-01-18 RX ADMIN — POTASSIUM CHLORIDE SCH MLS/HR: 7.46 INJECTION, SOLUTION INTRAVENOUS at 13:15

## 2017-01-18 NOTE — P.PN
Subjective


Principal diagnosis: 





Septic shock





Patient seen and examined in the ICU with nursing staff and wife at bedside.  

Patient has been hemodynamically stable.  He did not eat yesterday.  He only 

had one popsicle.  The wife is concerned about restarting TPN.  The patient 

denies any chest pain or shortness of breath.  He has been afebrile overnight.  

He is on room air.  The possibility of a PEG tube is presented to the wife and 

the patient.  They are both agreeable to discuss with surgical team and proceed 

with PEG.





Objective





- Vital Signs


Vital signs: 


 Vital Signs











Temp  97.4 F L  01/18/17 08:00


 


Pulse  90   01/18/17 11:00


 


Resp  20   01/18/17 11:00


 


BP  132/74   01/18/17 11:00


 


Pulse Ox  100   01/18/17 11:00








 Intake & Output











 01/17/17 01/18/17 01/18/17





 18:59 06:59 18:59


 


Intake Total 3060.0 1785.0 1067.5


 


Output Total 960 2330 285


 


Balance 2100.0 -545.0 782.5


 


Weight  71.6 kg 73.1 kg


 


Intake:   


 


  IV 1385.0 1585.0 517.5


 


    Piperacillin-Tazobactam 3 75.0 75.0 37.5





    .375 gm In Dextrose/Water   





    1 50ml.bag @ 12.5 mls/hr   





    IVPB Q8HR [facility] Rx#:   





    [id number]   


 


    Sodium Chloride 0.9% 1, 1310 1510 480





    000 ml @ 150 mls/hr IV .   





    Q6H40M [facility] Rx#:[id number]   


 


  Intake, IV Titration 650 100 550





  Amount   


 


    Magnesium Sulfate-D5w Pmx 200  





    1 gm In Dextrose/Water 1   





    100ml.bag @ 100 mls/hr   





    IVPB Q1H [facility] Rx#:   





    [id number]   


 


    Magnesium Sulfate-D5w Pmx   200





    1 gm In Dextrose/Water 1   





    100ml.bag @ 100 mls/hr   





    IVPB Q1H [facility] Rx#:   





    [id number]   


 


    Micafungin 100 mg In  100 





    Sodium Chloride 0.9% 100   





    ml @ 100 mls/hr IVPB Q24H   





    [facility] Rx#:[id number]   


 


    Potassium Chloride 10 meq 200  





    Lidocaine 2% Inj 10 mg   





    In Sodium Chloride 0.9%   





    100 ml @ 100 mls/hr IV   





    Q1HR [facility] Rx#:[id number]   


 


    Potassium Chloride 20 meq   100





    In Water For Injection 1   





    100ml.bag @ 50 mls/hr   





    IVPB ONCE ONE Rx#:   





    [id number]   


 


    Vancomycin 1,500 mg In   250





    Sodium Chloride 0.9% 250   





    ml @ 125 mls/hr IVPB   





    Q12HR [facility] Rx#:[id number]   


 


    Vancomycin 1,500 mg In 250  





    Sodium Chloride 0.9% 250   





    ml @ 125 mls/hr IVPB Q16H   





    [facility] Rx#:[id number]   


 


  Oral 325 100 


 


  Blood Product 700  


 


    Rc Cpda-1  Unit 250  





    [id number]   


 


Output:   


 


  Urine 610 630 285


 


  Stool 350 1700 


 


Other:   


 


  Voiding Method Indwelling Catheter Indwelling Catheter Indwelling Catheter








 ABP, PAP, CO, CI - Last Documented











Arterial Blood Pressure        93/39

















- Exam





Gen.: Patient is alert, oriented, mentation markedly improved today


Cardiovascular: Regular rate and rhythm, S1/S2


Lungs: Diminished at the bases otherwise clear


Abdomen: Soft nontender nondistended positive bowel sounds, colostomy in place


Extremities: No edema





- Labs


CBC & Chem 7: 


 01/18/17 05:15





 01/18/17 05:15


Labs: 


 Abnormal Lab Results - Last 24 Hours (Table)











  01/17/17 01/18/17 01/18/17 Range/Units





  07:15 05:15 05:15 


 


RBC    2.84 L  (4.30-5.90)  m/uL


 


Hgb    8.6 L D  (13.0-17.5)  gm/dL


 


Hct    27.7 L  (39.0-53.0)  %


 


RDW    16.2 H  (11.5-15.5)  %


 


Lymphocytes #    0.7 L  (1.0-4.8)  k/uL


 


Potassium   3.4 L   (3.5-5.1)  mmol/L


 


Chloride   117 H   ()  mmol/L


 


Carbon Dioxide   17 L   (22-30)  mmol/L


 


Ionized Calcium Alexis   6.3 H*   (4.5-5.3)  mg/dL


 


Crossmatch  See Detail    








 Microbiology - Last 24 Hours (Table)











 01/17/17 10:40 Blood Culture Gram Stain - Preliminary





 Blood 


 


 01/16/17 10:31 Blood Culture Gram Stain - Final





 Blood Blood Culture - Final





    Enterococcus faecalis VRE


 


 01/17/17 10:40 Blood Culture - Preliminary





 Blood 


 


 01/16/17 11:35 Urine Culture - Final





 Urine,Catheterized 


 


 01/16/17 13:45 Catheter Tip Culture - Preliminary





 Picc Line    Group D Enterococcus














Assessment and Plan


Plan: 





Septic shock, resolved


Bacteremia with VRE


Recent fungemia and bacteremia, suspect recurrence


Toxic metabolic encephalopathy, improving


History of Crohn's disease status post colectomy/ileostomy


History of lung cancer status post resection


Anemia


Hypercalcemia


Mild hyperkalemia


Mild non-anion gap metabolic acidosis


Acute kidney injury


Severe protein calorie malnutrition requiring TPN


Generalized weakness


Dehydration


History of upper GI bleed


History of compression fractures





O2 to maintain saturation greater than equal to 88%


IV fluid decrease to 75 mL per hour


Remove PICC line per infectious disease


Consult dietitian for PPN


Monitor electrolytes


Continue Accuchecks, borderline low BS


Place Gordillo catheter


GI and DVT prophylaxis


Antibiotics: Zosyn


Antifungals: Fluconazole


Blood, urine cultures - Per nursing gordillo placed and repeat urine culture as 

the urine is turbid


Continue CVC for now.


Nephrology recommendations


Consult PT and OT


Will consult surgery for PEG tube placement


Ok to transfer out of the ICU today.  Medsurg.

## 2017-01-18 NOTE — PN
DATE OF SERVICE: 01/18/2017



REASON FOR FOLLOWUP: VRE bacteremia secondary to PICC line infection. 



INTERVAL HISTORY: The patient is afebrile. He is hemodynamically 

stable, off any pressor support. Complaining of feeling weak, though 

denies any chest pain or cough. No abdominal pain or any diarrhea. On 

examination, blood pressure is 174/77 with a pulse of 106, temperature 

96.2. He is 95% on room air. General description is an elderly male 

lying in bed in no distress.  

RESPIRATORY SYSTEM: Unlabored breathing. Clear to auscultation 

anteriorly.  

HEART: S1, S2. Regular rate and rhythm. 

ABDOMEN: Soft. No tenderness. 



LABS: Hemoglobin 8.3, white count 6.1 with a BUN of 15, creatinine 

0.80. Blood cultures from yesterday are positive as well.  



DIAGNOSTIC IMPRESSION AND PLAN: Patient with a VRE bacteremia. PICC 

line has been discontinued. Will repeat blood culture  today 

to make sure no evidence of any persistent bacteremia. Will adjust 

antibiotic to ampicillin 2 grams q.4, discontinue Zosyn, vancomycin 

and micafungin. Family present at bedside. Their questions were 

answered.  



NICOLLE
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
## 2017-01-18 NOTE — P.PN
Subjective





Patient is seen in follow-up for hypercalcemia.  He is noted to have a high 

ionized calcium level.  Ionized calcium this morning is 6.3.  His total calcium 

level is in the normal range.  Workup so far for hypercalcemia has been quite 

benign.  He is currently being treated for VRE bacteremia.  Denies chest pain 

or shortness of breath.  His renal function is at baseline and he is 

nonoliguric.  No vomiting or diarrhea.





Vital signs are stable.


General: The patient appeared well nourished and normally developed. 


HEENT: Head exam is unremarkable. Neck is without jugular venous distension.


LUNGS: Lungs are clear to auscultation and percussion. Breath sounds decreased.


HEART: Rate and Rhythm are regular. First and second heart sounds normal. No 

murmurs, rubs or gallops. 


ABDOMEN: Abdominal exam reveals normal bowel sounds. Non-tender and non-

distended. No evidence of peritonitis.


EXTREMITITES: No clubbing, cyanosis, or edema.





Objective





- Vital Signs


Vital signs: 


 Vital Signs











Temp  98 F   01/18/17 12:00


 


Pulse  85   01/18/17 13:00


 


Resp  15   01/18/17 13:00


 


BP  134/73   01/18/17 13:00


 


Pulse Ox  100   01/18/17 13:00








 Intake & Output











 01/17/17 01/18/17 01/18/17





 18:59 06:59 18:59


 


Intake Total 3060.0 1785.0 1220.0


 


Output Total 960 2330 460


 


Balance 2100.0 -545.0 760.0


 


Weight  71.6 kg 73.1 kg


 


Intake:   


 


  IV 1385.0 1585.0 670.0


 


    Piperacillin-Tazobactam 3 75.0 75.0 50.0





    .375 gm In Dextrose/Water   





    1 50ml.bag @ 12.5 mls/hr   





    IVPB Q8HR [facility] Rx#:   





    [id number]   


 


    Sodium Chloride 0.9% 1, 1310 1510 620





    000 ml @ 75 mls/hr IV .   





    [id number] [facility] Rx#:[id number]   


 


  Intake, IV Titration 650 100 550





  Amount   


 


    Magnesium Sulfate-D5w Pmx 200  





    1 gm In Dextrose/Water 1   





    100ml.bag @ 100 mls/hr   





    IVPB Q1H [facility] Rx#:   





    [id number]   


 


    Magnesium Sulfate-D5w Pmx   200





    1 gm In Dextrose/Water 1   





    100ml.bag @ 100 mls/hr   





    IVPB Q1H [facility] Rx#:   





    [id number]   


 


    Micafungin 100 mg In  100 





    Sodium Chloride 0.9% 100   





    ml @ 100 mls/hr IVPB Q24H   





    [facility] Rx#:[id number]   


 


    Potassium Chloride 10 meq 200  





    Lidocaine 2% Inj 10 mg   





    In Sodium Chloride 0.9%   





    100 ml @ 100 mls/hr IV   





    Q1HR [facility] Rx#:[id number]   


 


    Potassium Chloride 20 meq   100





    In Water For Injection 1   





    100ml.bag @ 50 mls/hr   





    IVPB ONCE ONE Rx#:   





    [id number]   


 


    Vancomycin 1,500 mg In   250





    Sodium Chloride 0.9% 250   





    ml @ 125 mls/hr IVPB   





    Q12HR [facility] Rx#:[id number]   


 


    Vancomycin 1,500 mg In 250  





    Sodium Chloride 0.9% 250   





    ml @ 125 mls/hr IVPB Q16H   





    [facility] Rx#:[id number]   


 


  Oral 325 100 


 


  Blood Product 700  


 


    Rc Cpda-1  Unit 250  





    [id number]   


 


Output:   


 


  Urine 610 630 460


 


  Stool 350 1700 


 


Other:   


 


  Voiding Method Indwelling Catheter Indwelling Catheter Indwelling Catheter








 ABP, PAP, CO, CI - Last Documented











Arterial Blood Pressure        93/39

















- Labs


CBC & Chem 7: 


 01/18/17 05:15





 01/18/17 11:34


Labs: 


 Abnormal Lab Results - Last 24 Hours (Table)











  01/17/17 01/17/17 01/18/17 Range/Units





  05:35 07:15 05:15 


 


RBC     (4.30-5.90)  m/uL


 


Hgb     (13.0-17.5)  gm/dL


 


Hct     (39.0-53.0)  %


 


RDW     (11.5-15.5)  %


 


Lymphocytes #     (1.0-4.8)  k/uL


 


Potassium    3.4 L  (3.5-5.1)  mmol/L


 


Chloride    117 H  ()  mmol/L


 


Carbon Dioxide    17 L  (22-30)  mmol/L


 


POC Glucose (mg/dL)     (75-99)  mg/dL


 


Ionized Calcium Alexis    6.3 H*  (4.5-5.3)  mg/dL


 


Vit D 1,25-Dihydroxy  11 L    (20 - 79)  pg/mL


 


Free Kappa LC, Quant  4.71 H    (0.33 - 1.94)  mg/dL


 


Free Lambda LC, Quant  3.22 H    (0.57 - 2.63)  mg/dL


 


Crossmatch   See Detail   














  01/18/17 01/18/17 Range/Units





  05:15 12:01 


 


RBC  2.84 L   (4.30-5.90)  m/uL


 


Hgb  8.6 L D   (13.0-17.5)  gm/dL


 


Hct  27.7 L   (39.0-53.0)  %


 


RDW  16.2 H   (11.5-15.5)  %


 


Lymphocytes #  0.7 L   (1.0-4.8)  k/uL


 


Potassium    (3.5-5.1)  mmol/L


 


Chloride    ()  mmol/L


 


Carbon Dioxide    (22-30)  mmol/L


 


POC Glucose (mg/dL)   110 H  (75-99)  mg/dL


 


Ionized Calcium Alexis    (4.5-5.3)  mg/dL


 


Vit D 1,25-Dihydroxy    (20 - 79)  pg/mL


 


Free Kappa LC, Quant    (0.33 - 1.94)  mg/dL


 


Free Lambda LC, Quant    (0.57 - 2.63)  mg/dL


 


Crossmatch    








 Microbiology - Last 24 Hours (Table)











 01/16/17 13:45 Catheter Tip Culture - Final





 Picc Line    Enterococcus faecalis VRE


 


 01/17/17 10:40 Blood Culture Gram Stain - Preliminary





 Blood 


 


 01/16/17 10:31 Blood Culture Gram Stain - Final





 Blood Blood Culture - Final





    Enterococcus faecalis VRE


 


 01/17/17 10:40 Blood Culture - Preliminary





 Blood 


 


 01/16/17 11:35 Urine Culture - Final





 Urine,Catheterized 














Assessment and Plan


Plan: 





Assessment:


#1.  Mild hypercalcemia likely related to volume contraction as well as calcium 

supplementation in TPN.  Serum calcium level today is 8.7.  However when 

corrected for hypoalbuminemia it's 10.3.  Ionized calcium is 6.3.  Will also 

rule out other causes such as hyper parathyroidism, vitamin D excess, as well 

as malignancy - vitamin D level is normal.  125 dihydroxy vitamin D3 level is 

actually on the lower end.  No proteinuria on UA.  Dunmore/lambda ratio is 

intact.  Follow-up PTH level as well as PTH related peptide.


#2.  Sepsis secondary to enterococcus bacteremia.  


#3.  Hyperchloremic acidosis secondary to IV fluids.


#4.  Anemia.  Hemoglobin improved status post packed red blood cell transition.


#5.  Hypokalemia secondary to lack of oral intake.  Magnesium and replete.





Plan:


Maintain normal saline to be run at 75 mL an hour.


Follow-up PTH and PTH related peptide.


Calcitonin 100 units IM once today.


Antibiotics per infectious disease recommendations.


2 A of sodium bicarbonate IV push today.


Replace potassium.


Scheduled for PEG tube placement.

## 2017-01-19 LAB
ANION GAP SERPL CALC-SCNC: 11 MMOL/L
BUN SERPL-SCNC: 9 MG/DL (ref 9–20)
CALCIUM SPEC-MCNC: 8.1 MG/DL (ref 8.4–10.2)
CELLS COUNTED: 100
CH: 30
CHCM: 31.7
CHLORIDE SERPL-SCNC: 113 MMOL/L (ref 98–107)
CO2 SERPL-SCNC: 19 MMOL/L (ref 22–30)
ERYTHROCYTE [DISTWIDTH] IN BLOOD BY AUTOMATED COUNT: 3.45 M/UL (ref 4.3–5.9)
ERYTHROCYTE [DISTWIDTH] IN BLOOD: 16.4 % (ref 11.5–15.5)
GLUCOSE BLD-MCNC: 78 MG/DL (ref 75–99)
GLUCOSE BLD-MCNC: 81 MG/DL (ref 75–99)
GLUCOSE BLD-MCNC: 83 MG/DL (ref 75–99)
GLUCOSE BLD-MCNC: 83 MG/DL (ref 75–99)
GLUCOSE SERPL-MCNC: 83 MG/DL (ref 74–99)
HCT VFR BLD AUTO: 32.9 % (ref 39–53)
HDW: 3.52
HGB BLD-MCNC: 9.8 GM/DL (ref 13–17.5)
MAGNESIUM SPEC-SCNC: 1.7 MG/DL (ref 1.6–2.3)
MCH RBC QN AUTO: 28.6 PG (ref 25–35)
MCHC RBC AUTO-ENTMCNC: 30 G/DL (ref 31–37)
MCV RBC AUTO: 95.4 FL (ref 80–100)
MIS TEST REQUESTED (NON-BLOOD): (no result)
NON-AFRICAN AMERICAN GFR(MDRD): >60
PHOSPHATE SERPL-MCNC: 2.9 MG/DL (ref 2.5–4.5)
POTASSIUM SERPL-SCNC: 3.4 MMOL/L (ref 3.5–5.1)
SODIUM SERPL-SCNC: 143 MMOL/L (ref 137–145)
WBC # BLD AUTO: 5.1 K/UL (ref 3.8–10.6)
WBC (PEROX): 4.97

## 2017-01-19 PROCEDURE — 05HM33Z INSERTION OF INFUSION DEVICE INTO RIGHT INTERNAL JUGULAR VEIN, PERCUTANEOUS APPROACH: ICD-10-PCS

## 2017-01-19 PROCEDURE — B543ZZA ULTRASONOGRAPHY OF RIGHT JUGULAR VEINS, GUIDANCE: ICD-10-PCS

## 2017-01-19 PROCEDURE — 0DH63UZ INSERTION OF FEEDING DEVICE INTO STOMACH, PERCUTANEOUS APPROACH: ICD-10-PCS

## 2017-01-19 PROCEDURE — 03HY32Z INSERTION OF MONITORING DEVICE INTO UPPER ARTERY, PERCUTANEOUS APPROACH: ICD-10-PCS

## 2017-01-19 PROCEDURE — 4A133B1 MONITORING OF ARTERIAL PRESSURE, PERIPHERAL, PERCUTANEOUS APPROACH: ICD-10-PCS

## 2017-01-19 PROCEDURE — 4A133J1 MONITORING OF ARTERIAL PULSE, PERIPHERAL, PERCUTANEOUS APPROACH: ICD-10-PCS

## 2017-01-19 RX ADMIN — CALCITONIN SALMON SCH: 200 INJECTION, SOLUTION INTRAMUSCULAR; SUBCUTANEOUS at 11:18

## 2017-01-19 RX ADMIN — CEFAZOLIN SCH: 330 INJECTION, POWDER, FOR SOLUTION INTRAMUSCULAR; INTRAVENOUS at 17:41

## 2017-01-19 RX ADMIN — INSULIN LISPRO SCH: 100 INJECTION, SOLUTION INTRAVENOUS; SUBCUTANEOUS at 18:52

## 2017-01-19 RX ADMIN — HYDROMORPHONE HYDROCHLORIDE PRN MG: 1 INJECTION, SOLUTION INTRAMUSCULAR; INTRAVENOUS; SUBCUTANEOUS at 22:53

## 2017-01-19 RX ADMIN — SODIUM CHLORIDE SCH MLS/HR: 9 INJECTION, SOLUTION INTRAVENOUS at 11:36

## 2017-01-19 RX ADMIN — PANTOPRAZOLE SODIUM SCH MG: 40 TABLET, DELAYED RELEASE ORAL at 10:58

## 2017-01-19 RX ADMIN — TAMSULOSIN HYDROCHLORIDE SCH MG: 0.4 CAPSULE ORAL at 22:28

## 2017-01-19 RX ADMIN — HEPARIN SODIUM SCH UNIT: 5000 INJECTION, SOLUTION INTRAVENOUS; SUBCUTANEOUS at 23:24

## 2017-01-19 RX ADMIN — AMPICILLIN SCH MLS/HR: 2 INJECTION, POWDER, FOR SOLUTION INTRAVENOUS at 11:36

## 2017-01-19 RX ADMIN — HEPARIN SODIUM SCH UNIT: 5000 INJECTION, SOLUTION INTRAVENOUS; SUBCUTANEOUS at 10:58

## 2017-01-19 RX ADMIN — CITALOPRAM HYDROBROMIDE SCH MG: 20 TABLET ORAL at 22:28

## 2017-01-19 RX ADMIN — Medication SCH MG: at 22:28

## 2017-01-19 RX ADMIN — AMPICILLIN SCH MLS/HR: 2 INJECTION, POWDER, FOR SOLUTION INTRAVENOUS at 20:04

## 2017-01-19 RX ADMIN — INSULIN LISPRO SCH: 100 INJECTION, SOLUTION INTRAVENOUS; SUBCUTANEOUS at 01:42

## 2017-01-19 RX ADMIN — INSULIN LISPRO SCH: 100 INJECTION, SOLUTION INTRAVENOUS; SUBCUTANEOUS at 06:08

## 2017-01-19 RX ADMIN — SODIUM CHLORIDE SCH MLS/HR: 9 INJECTION, SOLUTION INTRAVENOUS at 14:07

## 2017-01-19 RX ADMIN — AMPICILLIN SCH MLS/HR: 2 INJECTION, POWDER, FOR SOLUTION INTRAVENOUS at 16:22

## 2017-01-19 RX ADMIN — AMPICILLIN SCH MLS/HR: 2 INJECTION, POWDER, FOR SOLUTION INTRAVENOUS at 03:17

## 2017-01-19 RX ADMIN — Medication SCH MG: at 11:36

## 2017-01-19 RX ADMIN — CEFAZOLIN SCH: 330 INJECTION, POWDER, FOR SOLUTION INTRAMUSCULAR; INTRAVENOUS at 11:17

## 2017-01-19 RX ADMIN — AMPICILLIN SCH MLS/HR: 2 INJECTION, POWDER, FOR SOLUTION INTRAVENOUS at 23:24

## 2017-01-19 RX ADMIN — AMPICILLIN SCH MLS/HR: 2 INJECTION, POWDER, FOR SOLUTION INTRAVENOUS at 07:52

## 2017-01-19 RX ADMIN — HEPARIN SODIUM SCH UNIT: 5000 INJECTION, SOLUTION INTRAVENOUS; SUBCUTANEOUS at 16:22

## 2017-01-19 RX ADMIN — INSULIN LISPRO SCH: 100 INJECTION, SOLUTION INTRAVENOUS; SUBCUTANEOUS at 11:50

## 2017-01-19 NOTE — PN
DATE OF SERVICE: 01/19/2017



REASON FOR FOLLOWUP: VRE bacteremia secondary to PICC line infection. 



INTERVAL HISTORY: The patient is afebrile. He did get his PEG tube 

today. The patient denies having any chest pain, shortness of breath 

or cough. No abdominal pain. On examination, blood pressure is 132/75 

with a pulse of 98, temperature 98.4. He is 96% on room air. General 

description is an elderly male lying in bed in no distress.  

RESPIRATORY SYSTEM: Unlabored breathing. Clear to auscultation 

anteriorly.  

HEART: S1, S2. Regular rate and rhythm. 

ABDOMEN: Soft. No tenderness. 



LABS: Hemoglobin is 9.8, white count 5.1 with a BUN of 9, creatinine 

0.75. Blood cultures obtained yesterday have been negative so far.  



DIAGNOSTIC IMPRESSION AND PLAN: Patient with a VRE bacteremia. Source 

is the PICC line that has been discontinued. Patient will be continued 

on the ampicillin. Duration of antibiotics should be at least 2 weeks 

from his negative blood culture. Continue supportive care.

## 2017-01-19 NOTE — P.PN
Subjective





71-year-old male being seen this afternoon on rounds.  Patient just returned 

from having a PEG tube placed for nutritional support by surgical service the 

wife is at the bedside.  Patient is awake and alert oriented 3 denying chest 

pain denying cough or shortness of breath.  Patient has a significant past 

medical history of having a subtotal colectomy with an ileostomy secondary to 

ulcerative colitis done on 12/09/2016.  Patient at that visit was stabilized 

and transferred to [facility] was receiving nutritional support with TPN.

  From there was transferred to [facility] facility where patient had been for 

the last for 5 days while at the facility developed fever and altered mental 

status.  Symptoms suggestive of sepsis suspect due to PICC line infection with 

the tip of the catheter positive for enterococcus faecalis VRE .  The blood 

cultures were also noted to be positive for enterococcus VRE.  Patient has 

chronic severe protein calorie malnutrition due to poor caloric intake





Objective





- Vital Signs


Vital signs: 


 Vital Signs











Temp  97.0 F L  01/19/17 07:00


 


Pulse  99   01/19/17 07:00


 


Resp  20   01/19/17 07:00


 


BP  151/82   01/19/17 07:00


 


Pulse Ox  97   01/19/17 07:00








 Intake & Output











 01/18/17 01/19/17 01/19/17





 18:59 06:59 18:59


 


Intake Total 1670.0 0 20


 


Output Total 835 400 200


 


Balance 835.0 -400 -180


 


Weight 73.1 kg 79.379 kg 79.379 kg


 


Intake:   


 


  .0  20


 


    Piperacillin-Tazobactam 3 50.0  





    .375 gm In Dextrose/Water   





    1 50ml.bag @ 12.5 mls/hr   





    IVPB Q8HR [facility] Rx#:   





    [id number]   


 


    Sodium Chloride 0.9% 1, 720  





    000 ml @ 50 mls/hr IV .   





    Q20H [facility] Rx#:[id number]   


 


  Intake, IV Titration 750  





  Amount   


 


    Ampicillin 2,000 mg In 100  





    Sodium Chloride 0.9% 100   





    ml @ 100 mls/hr IVPB Q4HR   





    [facility] Rx#:[id number]   


 


    Magnesium Sulfate-D5w Pmx 200  





    1 gm In Dextrose/Water 1   





    100ml.bag @ 100 mls/hr   





    IVPB Q1H [facility] Rx#:   





    [id number]   


 


    Potassium Chloride 10 meq 100  





    In Water For Injection 1   





    100ml.bag @ 100 mls/hr   





    IVPB Q1H [facility] Rx#:   





    [id number]   


 


    Potassium Chloride 20 meq 100  





    In Water For Injection 1   





    100ml.bag @ 50 mls/hr   





    IVPB ONCE ONE Rx#:   





    [id number]   


 


    Vancomycin 1,500 mg In 250  





    Sodium Chloride 0.9% 250   





    ml @ 125 mls/hr IVPB   





    Q12HR [facility] Rx#:[id number]   


 


  Oral 150 0 


 


Output:   


 


  Urine 635  100


 


  Stool 200 400 100


 


Other:   


 


  Voiding Method Incontinent Incontinent Incontinent


 


  # Voids 3 1 


 


  # Bowel Movements 0  








 ABP, PAP, CO, CI - Last Documented











Arterial Blood Pressure        93/39

















- Exam





Physical exam


71-year-old male awake alert resting in bed oriented 3


Lungs essentially clear adequate air movement no shortness of breath noted


Heart S1-S2 audible regular denies chest pain


Abdomen soft ostomy functioning scant amount of stool noted.  Dressing to the 

left lateral chest wall from a prior surgical transverse incision is dry PEG 

tube dressing dry.  Suture line midabdomen small opening noted no drainage.  

Patient is urinating with no difficulty


Extremities no edema noted








- Labs


CBC & Chem 7: 


 01/19/17 09:23





 01/19/17 09:23


Labs: 


 Abnormal Lab Results - Last 24 Hours (Table)











  01/18/17 01/18/17 01/19/17 Range/Units





  11:34 18:50 09:23 


 


RBC     (4.30-5.90)  m/uL


 


Hgb     (13.0-17.5)  gm/dL


 


Hct     (39.0-53.0)  %


 


MCHC     (31.0-37.0)  g/dL


 


RDW     (11.5-15.5)  %


 


Potassium    3.4 L  (3.5-5.1)  mmol/L


 


Chloride    113 H  ()  mmol/L


 


Carbon Dioxide    19 L  (22-30)  mmol/L


 


POC Glucose (mg/dL)   103 H   (75-99)  mg/dL


 


Calcium    8.1 L  (8.4-10.2)  mg/dL


 


PTH Intact  <5.5 L    (14.0-72.0)  pg/mL














  01/19/17 Range/Units





  09:23 


 


RBC  3.45 L  (4.30-5.90)  m/uL


 


Hgb  9.8 L  (13.0-17.5)  gm/dL


 


Hct  32.9 L  (39.0-53.0)  %


 


MCHC  30.0 L  (31.0-37.0)  g/dL


 


RDW  16.4 H  (11.5-15.5)  %


 


Potassium   (3.5-5.1)  mmol/L


 


Chloride   ()  mmol/L


 


Carbon Dioxide   (22-30)  mmol/L


 


POC Glucose (mg/dL)   (75-99)  mg/dL


 


Calcium   (8.4-10.2)  mg/dL


 


PTH Intact   (14.0-72.0)  pg/mL








 Microbiology - Last 24 Hours (Table)











 01/17/17 10:40 Blood Culture Gram Stain - Preliminary





 Blood Blood Culture - Preliminary





    Group D Enterococcus


 


 01/16/17 13:45 Catheter Tip Culture - Final





 Picc Line    Enterococcus faecalis VRE


 


 01/16/17 10:31 Blood Culture Gram Stain - Final





 Blood Blood Culture - Final





    Enterococcus faecalis VRE














Assessment and Plan


Plan: 





Impression


Present on admission severe sepsis due to PICC line infection with catheter tip 

culture positive for Enterococcus faecalis VRE and blood cultures positive for 

Enterococcus faecalis VRE.


. Severe protein calorie malnutrition with history of chronic TPN infusion With 

muscle wasting


. History of  subtotal colectomy with ileostomy secondary to ulcerative colitis 

on 12/09/2016.  


Mild hypercalcemia likely related to volume contraction as well as calcium 

supplementation in TPN.    


  Present on admission Sepsis secondary to enterococcus bacteremia.  


  Hyperchloremic acidosis secondary to IV fluids.


.  Anemia.  Hemoglobin improved status post packed red blood cell transition.


  Hypokalemia secondary to lack of oral intake.  Magnesium and replete.


Chronic physical debility due to chronic illness


History of lung cancer status post resection 2016


Present on admission clinical dehydration


History of compression fractures lumbar spine 2016


History of Crohn's disease status post colectomy ostomy


Depressive disorder nonspecified





Plan


Nutritional support will be started per PEG tube when okay with surgical service


Remove the central line


Continue recommendations by infectious disease, nephrology pulmonology and 

nutritional support


DVT and GI prophylaxis


PT OT eval


Attempt to increase activity


Further recommendations pending will








The above dictated assessment and findings were discussed with dr davis


.  Impression and the plan of care have been dictated as directed.  Shannan Ro 

nurse practitioner acting as a scribe for dr davis

## 2017-01-19 NOTE — P.OP
Date of Procedure: 01/19/17


Preoperative Diagnosis: 


Malnutrition


Postoperative Diagnosis: 


Malnutrition


Procedure(s) Performed: 


EGD with PEG


Anesthesia: MAC


Surgeon: Emanuel Diop


Pathology: none sent


Condition: stable


Disposition: PACU


Description of Procedure: 


The patient's placed on the endoscopy table in the lateral position.  He 

received IV sedation.  The gastroscope some placed oropharynx and passed into 

the esophagus and into the stomach.  Scope was then placed through the pylorus.

  The first and second portion duodenum appeared normal.  There is no evidence 

of gastric outlet obstruction.  The scope was then brought back and stomach and 

the antrum appeared normal.  Scope was unretroflexed and remainder some 

appeared normal.  The stomach was insufflated with air.  And then a suitable 

light reflux was seen in the anterior abdominal wall.  The skin was localized 

with 1% local Xylocaine.  And then a needle was placed in the stomach under 

direct visualization.  The needle was snared.  The wire was then placed through 

the needle.  The wire was snared and brought through the oropharynx with the 

scope.  The PEG tube was then placed overtop the wire.  The PEG tube was 

brought down into the stomach by traction on the wire.  The PEG tube was then 

secured at the 3 cm bindu.  The patient top procedure well and was sent to the 

floor in stable condition.

## 2017-01-19 NOTE — P.PN
Subjective





Patient is seen in follow-up for hypercalcemia.  He is noted to have a high 

ionized calcium level.  Ionized calcium peaked at 6.3.  He did receive 1 dose 

of calcitonin and ionized calcium today is 5.2.  Workup so far for 

hypercalcemia has been quite benign.  He is currently being treated for VRE 

bacteremia.  Denies chest pain or shortness of breath.  His renal function is 

at baseline and he is nonoliguric.  No vomiting or diarrhea.





Vital signs are stable.


General: The patient appeared well nourished and normally developed. 


HEENT: Head exam is unremarkable. Neck is without jugular venous distension.


LUNGS: Lungs are clear to auscultation and percussion. Breath sounds decreased.


HEART: Rate and Rhythm are regular. First and second heart sounds normal. No 

murmurs, rubs or gallops. 


ABDOMEN: Abdominal exam reveals normal bowel sounds. Non-tender and non-

distended. No evidence of peritonitis.


EXTREMITITES: No clubbing, cyanosis, or edema.





Objective





- Vital Signs


Vital signs: 


 Vital Signs











Temp  97.0 F L  01/19/17 07:00


 


Pulse  99   01/19/17 07:00


 


Resp  20   01/19/17 07:00


 


BP  151/82   01/19/17 07:00


 


Pulse Ox  97   01/19/17 07:00








 Intake & Output











 01/18/17 01/19/17 01/19/17





 18:59 06:59 18:59


 


Intake Total 1670.0 0 20


 


Output Total 835 400 


 


Balance 835.0 -400 20


 


Weight 73.1 kg 79.379 kg 


 


Intake:   


 


  .0  20


 


    Piperacillin-Tazobactam 3 50.0  





    .375 gm In Dextrose/Water   





    1 50ml.bag @ 12.5 mls/hr   





    IVPB Q8HR [facility] Rx#:   





    [id number]   


 


    Sodium Chloride 0.9% 1, 720  





    000 ml @ 75 mls/hr IV .   





    [id number] [facility] Rx#:[id number]   


 


  Intake, IV Titration 750  





  Amount   


 


    Ampicillin 2,000 mg In 100  





    Sodium Chloride 0.9% 100   





    ml @ 100 mls/hr IVPB Q4HR   





    [facility] Rx#:[id number]   


 


    Magnesium Sulfate-D5w Pmx 200  





    1 gm In Dextrose/Water 1   





    100ml.bag @ 100 mls/hr   





    IVPB Q1H [facility] Rx#:   





    [id number]   


 


    Potassium Chloride 10 meq 100  





    In Water For Injection 1   





    100ml.bag @ 100 mls/hr   





    IVPB Q1H [facility] Rx#:   





    [id number]   


 


    Potassium Chloride 20 meq 100  





    In Water For Injection 1   





    100ml.bag @ 50 mls/hr   





    IVPB ONCE ONE Rx#:   





    [id number]   


 


    Vancomycin 1,500 mg In 250  





    Sodium Chloride 0.9% 250   





    ml @ 125 mls/hr IVPB   





    Q12HR [facility] Rx#:[id number]   


 


  Oral 150 0 


 


Output:   


 


  Urine 635  


 


  Stool 200 400 


 


Other:   


 


  Voiding Method Incontinent Incontinent Incontinent


 


  # Voids 3 1 


 


  # Bowel Movements 0  








 ABP, PAP, CO, CI - Last Documented











Arterial Blood Pressure        93/39

















- Labs


CBC & Chem 7: 


 01/19/17 09:23





 01/19/17 09:23


Labs: 


 Abnormal Lab Results - Last 24 Hours (Table)











  01/17/17 01/18/17 01/18/17 Range/Units





  05:35 11:34 12:01 


 


RBC     (4.30-5.90)  m/uL


 


Hgb     (13.0-17.5)  gm/dL


 


Hct     (39.0-53.0)  %


 


MCHC     (31.0-37.0)  g/dL


 


RDW     (11.5-15.5)  %


 


Potassium     (3.5-5.1)  mmol/L


 


Chloride     ()  mmol/L


 


Carbon Dioxide     (22-30)  mmol/L


 


POC Glucose (mg/dL)    110 H  (75-99)  mg/dL


 


Calcium     (8.4-10.2)  mg/dL


 


Vit D 1,25-Dihydroxy  11 L    (20 - 79)  pg/mL


 


PTH Intact   <5.5 L   (14.0-72.0)  pg/mL


 


Free Kappa LC, Quant  4.71 H    (0.33 - 1.94)  mg/dL


 


Free Lambda LC, Quant  3.22 H    (0.57 - 2.63)  mg/dL














  01/18/17 01/19/17 01/19/17 Range/Units





  18:50 09:23 09:23 


 


RBC    3.45 L  (4.30-5.90)  m/uL


 


Hgb    9.8 L  (13.0-17.5)  gm/dL


 


Hct    32.9 L  (39.0-53.0)  %


 


MCHC    30.0 L  (31.0-37.0)  g/dL


 


RDW    16.4 H  (11.5-15.5)  %


 


Potassium   3.4 L   (3.5-5.1)  mmol/L


 


Chloride   113 H   ()  mmol/L


 


Carbon Dioxide   19 L   (22-30)  mmol/L


 


POC Glucose (mg/dL)  103 H    (75-99)  mg/dL


 


Calcium   8.1 L   (8.4-10.2)  mg/dL


 


Vit D 1,25-Dihydroxy     (20 - 79)  pg/mL


 


PTH Intact     (14.0-72.0)  pg/mL


 


Free Kappa LC, Quant     (0.33 - 1.94)  mg/dL


 


Free Lambda LC, Quant     (0.57 - 2.63)  mg/dL








 Microbiology - Last 24 Hours (Table)











 01/17/17 10:40 Blood Culture Gram Stain - Preliminary





 Blood Blood Culture - Preliminary





    Group D Enterococcus


 


 01/16/17 13:45 Catheter Tip Culture - Final





 Picc Line    Enterococcus faecalis VRE


 


 01/16/17 10:31 Blood Culture Gram Stain - Final





 Blood Blood Culture - Final





    Enterococcus faecalis VRE














Assessment and Plan


Plan: 





Assessment:


#1.  Mild hypercalcemia likely related to volume contraction as well as calcium 

supplementation in TPN.  Serum calcium level today is 8.1 with ionized calcium 

in the normal range at 5.2.  Status post 1 dose of calcitonin.  Will also rule 

out other causes such as hyperparathyroidism, vitamin D excess, as well as 

malignancy - vitamin D level is normal.  125 dihydroxy vitamin D3 level is 

actually on the lower end.  PTH is suppressed.  No proteinuria on UA.  [place]/

lambda ratio is intact.  Follow-up PTH related peptide.


#2.  Sepsis secondary to enterococcus bacteremia.  


#3.  Hyperchloremic acidosis secondary to IV fluids.


#4.  Anemia.  Hemoglobin improved status post packed red blood cell transition.


#5.  Hypokalemia secondary to lack of oral intake.  Magnesium replete.





Plan:


Decrease IV fluids to be run at 50 mL an hour.


Follow-up PTH related peptide.


Antibiotics per infectious disease recommendations.


Start oral sodium bicarbonate supplementation.


Replace potassium.


PEG tube placed today.  Feeds to be started tomorrow.

## 2017-01-20 LAB
ANION GAP SERPL CALC-SCNC: 9 MMOL/L
BUN SERPL-SCNC: 7 MG/DL (ref 9–20)
CALCIUM SPEC-MCNC: 8.1 MG/DL (ref 8.4–10.2)
CHLORIDE SERPL-SCNC: 114 MMOL/L (ref 98–107)
CO2 SERPL-SCNC: 20 MMOL/L (ref 22–30)
GLUCOSE BLD-MCNC: 104 MG/DL (ref 75–99)
GLUCOSE BLD-MCNC: 72 MG/DL (ref 75–99)
GLUCOSE BLD-MCNC: 87 MG/DL (ref 75–99)
GLUCOSE BLD-MCNC: 91 MG/DL (ref 75–99)
GLUCOSE SERPL-MCNC: 77 MG/DL (ref 74–99)
MAGNESIUM SPEC-SCNC: 1.6 MG/DL (ref 1.6–2.3)
NON-AFRICAN AMERICAN GFR(MDRD): >60
PHOSPHATE SERPL-MCNC: 3.1 MG/DL (ref 2.5–4.5)
POTASSIUM SERPL-SCNC: 3.2 MMOL/L (ref 3.5–5.1)
SODIUM SERPL-SCNC: 143 MMOL/L (ref 137–145)

## 2017-01-20 RX ADMIN — Medication SCH MG: at 20:43

## 2017-01-20 RX ADMIN — TAMSULOSIN HYDROCHLORIDE SCH MG: 0.4 CAPSULE ORAL at 20:42

## 2017-01-20 RX ADMIN — HYDROMORPHONE HYDROCHLORIDE PRN MG: 1 INJECTION, SOLUTION INTRAMUSCULAR; INTRAVENOUS; SUBCUTANEOUS at 14:28

## 2017-01-20 RX ADMIN — INSULIN LISPRO SCH: 100 INJECTION, SOLUTION INTRAVENOUS; SUBCUTANEOUS at 18:14

## 2017-01-20 RX ADMIN — AMPICILLIN SCH MLS/HR: 2 INJECTION, POWDER, FOR SOLUTION INTRAVENOUS at 12:13

## 2017-01-20 RX ADMIN — HYDROMORPHONE HYDROCHLORIDE PRN MG: 1 INJECTION, SOLUTION INTRAMUSCULAR; INTRAVENOUS; SUBCUTANEOUS at 08:36

## 2017-01-20 RX ADMIN — CITALOPRAM HYDROBROMIDE SCH MG: 20 TABLET ORAL at 20:42

## 2017-01-20 RX ADMIN — HYDROMORPHONE HYDROCHLORIDE PRN MG: 1 INJECTION, SOLUTION INTRAMUSCULAR; INTRAVENOUS; SUBCUTANEOUS at 22:27

## 2017-01-20 RX ADMIN — INSULIN LISPRO SCH: 100 INJECTION, SOLUTION INTRAVENOUS; SUBCUTANEOUS at 12:18

## 2017-01-20 RX ADMIN — INSULIN LISPRO SCH: 100 INJECTION, SOLUTION INTRAVENOUS; SUBCUTANEOUS at 05:11

## 2017-01-20 RX ADMIN — HEPARIN SODIUM SCH UNIT: 5000 INJECTION, SOLUTION INTRAVENOUS; SUBCUTANEOUS at 08:20

## 2017-01-20 RX ADMIN — CEFAZOLIN SCH MLS/HR: 330 INJECTION, POWDER, FOR SOLUTION INTRAMUSCULAR; INTRAVENOUS at 12:15

## 2017-01-20 RX ADMIN — Medication SCH MG: at 08:20

## 2017-01-20 RX ADMIN — AMPICILLIN SCH MLS/HR: 2 INJECTION, POWDER, FOR SOLUTION INTRAVENOUS at 16:44

## 2017-01-20 RX ADMIN — AMPICILLIN SCH MLS/HR: 2 INJECTION, POWDER, FOR SOLUTION INTRAVENOUS at 20:44

## 2017-01-20 RX ADMIN — HEPARIN SODIUM SCH UNIT: 5000 INJECTION, SOLUTION INTRAVENOUS; SUBCUTANEOUS at 16:44

## 2017-01-20 RX ADMIN — INSULIN LISPRO SCH: 100 INJECTION, SOLUTION INTRAVENOUS; SUBCUTANEOUS at 00:06

## 2017-01-20 RX ADMIN — AMPICILLIN SCH MLS/HR: 2 INJECTION, POWDER, FOR SOLUTION INTRAVENOUS at 05:08

## 2017-01-20 RX ADMIN — PANTOPRAZOLE SODIUM SCH MG: 40 TABLET, DELAYED RELEASE ORAL at 08:20

## 2017-01-20 RX ADMIN — AMPICILLIN SCH MLS/HR: 2 INJECTION, POWDER, FOR SOLUTION INTRAVENOUS at 08:23

## 2017-01-20 NOTE — CDI
In responding to this query, please exercise your independent professional 
judgment. The [facility] Coding Staff and Clinical Documentation Specialists 
appreciate your assistance in clarifying documentation, maintaining compliance 
with coding guidelines, accurately documenting patients condition and 
capturing severity of illness. The fact that a question is asked does not imply 
that any particular answer is desired or expected. Communication forms are a 
method of clarifying documentation and are not made part of the Legal Health 
Record. Thank you in advance for your clarification.

 Last Revision, November 2015



Charito Kaplan

[address]

[place] [address]



Documentation Clarification Form



Date: 1/20/2017 12:27:00 PM

From: Marcel Collins, RN, BSN, CDI

Phone: [phone]

MRN: [id number]

Admit Date: 1/16/2017 7:21:00 AM

Patient Name: Suraj Diallo

Visit Number: [id number]



Dr. Ricky Nicole:



A diagnosis of anemia lacks specificity to accurately reflect your patients 
severity of condition and clarification is needed.  



Patient history/risk factors:  72 yo male with history of lung cancer, severe 
PCM on TPN and Crohn's disease, s/p PEG tube insertion on this admission.  
Currently being treated for sepsis secondary to VRE from PICC line.



Clinical Indicators:

     Hemoglobin: 6.8

     Hematocrit:  22.8



Treatment:        

1U PRBC's, serial CBC's



In order to capture the severity of condition, please clarify the type of 
anemia and etiology if known:



   Acute blood loss anemia

   Acute on chronic blood loss anemia

   Chronic blood loss anemia

   Iron deficiency anemia

   Hemolytic anemia

   Drug induced anemia

   Anemia due to malignancy

   Nutritional anemia

   Anemia of chronic kidney disease

   Unable to determine

   Other, please specify



Please document in your progress notes and discharge summary in order to 
capture severity of illness and risk of mortality. Include clinical findings 
that support your diagnosis.



 FYI: Press F11 to launch patient chart.



_____ Place X here if this finding has no clinical significance, is not 
applicable or if you are not able to provide any additional documentation.



NICOLLE

## 2017-01-20 NOTE — PN
DATE OF SERVICE:  01/20/2017



Mr. Suraj Diallo is seen, evaluated, examined while covering for Dr. Ricky Nicole.  He is a 71-year-old male, well known to me. The 

patient has problems associated with ulcerative colitis. Patient is 

status post colectomy. Patient recently had a PEG tube placement. 

Patient has a polymicrobial infection and bacteremia, as well as 

fungemia by history. Clinically patient is doing well. Tube feed to be 

started today. Hemodynamic status is stable.  



Last set of vitals include blood pressure is 135/69, respiratory rate 

20, pulse 95, temperature 98, saturation 94%.  

HEENT EXAMINATION: Otherwise unremarkable. 

NECK: Supple. 

LUNGS: Good air entry bilaterally. 

HEART: Regular rate and rhythm. 

ABDOMEN: Soft. 

NEUROLOGICAL EXAMINATION: Otherwise, awake and alert. 



Abdominal exam shows colostomy, stable PEG tube, overall stable. 



Blood cultures 1/17/17 revealed enterococcus. PICC line tip was 

positive for enterococcus as well.  



IMPRESSION: 

1. Enterococcal bacteremia. 

2. Recent history of lung cancer. 

3. Ulcerative colitis, status post total colectomy. 

4. Generalized weakness and medical debility. 

5. Borderline hypercalcemia. 



Plan and recommendation is as above. Continue supportive care. Follow 

clinical course closely. Will monitor and observe patient on tube 

feed. Further recommendations pending.

## 2017-01-20 NOTE — P.PN
Subjective





Patient is seen in follow-up for hypercalcemia.  He is noted to have a high 

ionized calcium level.  Ionized calcium peaked at 6.3.  He did receive 1 dose 

of calcitonin and ionized calcium as of yesterday was down to 5.2.  Workup so 

far for hypercalcemia has been quite benign.  He is currently being treated for 

VRE bacteremia.  Denies chest pain or shortness of breath.  His renal function 

is at baseline and he is nonoliguric.  No vomiting or diarrhea.  He underwent a 

PEG tube placement yesterday.





Vital signs are stable.


General: The patient appeared well nourished and normally developed. 


HEENT: Head exam is unremarkable. Neck is without jugular venous distension.


LUNGS: Lungs are clear to auscultation and percussion. Breath sounds decreased.


HEART: Rate and Rhythm are regular. First and second heart sounds normal. No 

murmurs, rubs or gallops. 


ABDOMEN: Abdominal exam reveals normal bowel sounds. Non-tender and non-

distended. No evidence of peritonitis.


EXTREMITITES: No clubbing, cyanosis, or edema.





Objective





- Vital Signs


Vital signs: 


 Vital Signs











Temp  98.1 F   01/20/17 07:00


 


Pulse  95   01/20/17 07:00


 


Resp  20   01/20/17 07:00


 


BP  135/69   01/20/17 07:00


 


Pulse Ox  94 L  01/20/17 07:00








 Intake & Output











 01/19/17 01/20/17 01/20/17





 18:59 06:59 18:59


 


Intake Total 20  


 


Output Total 300 725 


 


Balance -280 -725 


 


Weight 79.379 kg 78 kg 78 kg


 


Intake:   


 


  IV 20  


 


Output:   


 


  Urine 200 600 


 


  Stool 100 125 


 


Other:   


 


  Voiding Method Incontinent Urinal 





  Diaper 


 


  # Voids 1 2 


 


  # Bowel Movements  1 








 ABP, PAP, CO, CI - Last Documented











Arterial Blood Pressure        93/39

















- Labs


CBC & Chem 7: 


 01/19/17 09:23





 01/19/17 09:23


Labs: 


 Abnormal Lab Results - Last 24 Hours (Table)











  01/19/17 01/19/17 01/20/17 Range/Units





  09:23 09:23 00:00 


 


RBC   3.45 L   (4.30-5.90)  m/uL


 


Hgb   9.8 L   (13.0-17.5)  gm/dL


 


Hct   32.9 L   (39.0-53.0)  %


 


MCHC   30.0 L   (31.0-37.0)  g/dL


 


RDW   16.4 H   (11.5-15.5)  %


 


Potassium  3.4 L    (3.5-5.1)  mmol/L


 


Chloride  113 H    ()  mmol/L


 


Carbon Dioxide  19 L    (22-30)  mmol/L


 


POC Glucose (mg/dL)    72 L  (75-99)  mg/dL


 


Calcium  8.1 L    (8.4-10.2)  mg/dL








 Microbiology - Last 24 Hours (Table)











 01/17/17 10:40 Blood Culture Gram Stain - Final





 Blood Blood Culture - Final





    Enterococcus faecalis


 


 01/18/17 11:34 Blood Culture - Preliminary





 Blood    No Growth after 24 hours














Assessment and Plan


Plan: 





Assessment:


#1.  Mild hypercalcemia likely related to volume contraction as well as calcium 

supplementation in TPN.  Serum calcium level as of yesterday was 8.1 with 

ionized calcium in the normal range at 5.2.  His corrected total calcium for 

hypoalbuminemia was 9.7. Status post 1 dose of calcitonin.  Will also rule out 

other causes such as hyperparathyroidism, vitamin D excess, as well as 

malignancy - vitamin D level is normal.  125 dihydroxy vitamin D3 level is 

actually on the lower end.  PTH is suppressed.  No proteinuria on UA.  [place]/

lambda ratio is intact.  Follow-up PTH related peptide.


#2.  Sepsis secondary to enterococcus bacteremia.  


#3.  Hyperchloremic acidosis secondary to IV fluids.


#4.  Anemia.  Hemoglobin improved status post packed red blood cell transition.


#5.  Hypokalemia secondary to lack of oral intake.  Magnesium replete.





Plan:


 Maintain IV fluids to be run at 50 mL an hour.  Hep-Lock once tube feeds 

initiated.


Follow-up PTH related peptide.


Antibiotics per infectious disease recommendations.


Maintain oral sodium bicarbonate supplementation.

## 2017-01-20 NOTE — P.PN
Subjective


Principal diagnosis: 


Malnutrition





Patient is status post PEG tube placement, postop day 1 secondary to 

malnutrition.  Patient is feeling well.  Denies nausea, vomiting, abdominal 

pain.  Afebrile.





Objective





- Vital Signs


Vital signs: 


 Vital Signs











Temp  97.9 F   01/20/17 15:00


 


Pulse  103 H  01/20/17 15:00


 


Resp  20   01/20/17 15:00


 


BP  147/73   01/20/17 15:00


 


Pulse Ox  92 L  01/20/17 15:00








 Intake & Output











 01/19/17 01/20/17 01/20/17





 18:59 06:59 18:59


 


Intake Total 20  


 


Output Total 300 725 


 


Balance -280 -725 


 


Weight 79.379 kg 78 kg 78 kg


 


Intake:   


 


  IV 20  


 


Output:   


 


  Urine 200 600 


 


  Stool 100 125 


 


Other:   


 


  Voiding Method Incontinent Urinal Urinal





  Diaper Diaper


 


  # Voids 1 2 


 


  # Bowel Movements  1 








 ABP, PAP, CO, CI - Last Documented











Arterial Blood Pressure        93/39

















- Exam


GENERAL: Pt awake and alert, in no acute distress.


ABDOMEN: Soft, nontender, nondistended, normoactive bowel sounds.  No guarding, 

no rebound. PEG tube clamped.  Incision dry and intact.


NEUROLOGICAL: Pt oriented x 3. 














- Labs


CBC & Chem 7: 


 01/19/17 09:23





 01/20/17 07:58


Labs: 


 Abnormal Lab Results - Last 24 Hours (Table)











  01/20/17 01/20/17 Range/Units





  00:00 07:58 


 


Potassium   3.2 L  (3.5-5.1)  mmol/L


 


Chloride   114 H  ()  mmol/L


 


Carbon Dioxide   20 L  (22-30)  mmol/L


 


BUN   7 L  (9-20)  mg/dL


 


POC Glucose (mg/dL)  72 L   (75-99)  mg/dL


 


Calcium   8.1 L  (8.4-10.2)  mg/dL


 


Ionized Calcium [place]   5.5 H  (4.5-5.3)  mg/dL








 Microbiology - Last 24 Hours (Table)











 01/18/17 11:34 Blood Culture - Preliminary





 Blood    No Growth after 48 hours


 


 01/17/17 10:40 Blood Culture Gram Stain - Final





 Blood Blood Culture - Final





    Enterococcus faecalis














Assessment and Plan


Plan: 


Impression:





1.  Severe sepsis secondary infected PICC line infection with catheter tip 

culture positive for Enterococcus faecalis VRE and blood cultures positive for 

Enterococcus faecalis VRE.


2. Severe protein calorie malnutrition with history of chronic TPN infusion 

status post insertion of PEG tube on 01/19/2017.


3. History of  subtotal colectomy with ileostomy secondary to ulcerative 

colitis on 12/09/2016.  





Plan:





Start enteral tube feedings per dietitian recommendations.  Maintain aspiration 

precautions.  Continue local wound care.  Continue to follow with consultants 

and primary service. 





Impression and plan discussed with and directed by Dr. Diop. Bryson Zarate acting as scribe for Dr. Diop.

## 2017-01-21 LAB
ANION GAP SERPL CALC-SCNC: 8 MMOL/L
BUN SERPL-SCNC: 6 MG/DL (ref 9–20)
CALCIUM SPEC-MCNC: 8.2 MG/DL (ref 8.4–10.2)
CHLORIDE SERPL-SCNC: 112 MMOL/L (ref 98–107)
CO2 SERPL-SCNC: 22 MMOL/L (ref 22–30)
GLUCOSE BLD-MCNC: 103 MG/DL (ref 75–99)
GLUCOSE BLD-MCNC: 104 MG/DL (ref 75–99)
GLUCOSE BLD-MCNC: 107 MG/DL (ref 75–99)
GLUCOSE BLD-MCNC: 96 MG/DL (ref 75–99)
GLUCOSE SERPL-MCNC: 104 MG/DL (ref 74–99)
MAGNESIUM SPEC-SCNC: 1.4 MG/DL (ref 1.6–2.3)
NON-AFRICAN AMERICAN GFR(MDRD): >60
PHOSPHATE SERPL-MCNC: 3.1 MG/DL (ref 2.5–4.5)
POTASSIUM SERPL-SCNC: 3.1 MMOL/L (ref 3.5–5.1)
SODIUM SERPL-SCNC: 142 MMOL/L (ref 137–145)

## 2017-01-21 RX ADMIN — HYDROMORPHONE HYDROCHLORIDE PRN MG: 1 INJECTION, SOLUTION INTRAMUSCULAR; INTRAVENOUS; SUBCUTANEOUS at 02:19

## 2017-01-21 RX ADMIN — AMPICILLIN SCH MLS/HR: 2 INJECTION, POWDER, FOR SOLUTION INTRAVENOUS at 19:43

## 2017-01-21 RX ADMIN — PANTOPRAZOLE SODIUM SCH MG: 40 TABLET, DELAYED RELEASE ORAL at 08:08

## 2017-01-21 RX ADMIN — AMPICILLIN SCH MLS/HR: 2 INJECTION, POWDER, FOR SOLUTION INTRAVENOUS at 15:32

## 2017-01-21 RX ADMIN — INSULIN LISPRO SCH: 100 INJECTION, SOLUTION INTRAVENOUS; SUBCUTANEOUS at 06:02

## 2017-01-21 RX ADMIN — HYDROMORPHONE HYDROCHLORIDE PRN MG: 1 INJECTION, SOLUTION INTRAMUSCULAR; INTRAVENOUS; SUBCUTANEOUS at 10:42

## 2017-01-21 RX ADMIN — AMPICILLIN SCH MLS/HR: 2 INJECTION, POWDER, FOR SOLUTION INTRAVENOUS at 04:15

## 2017-01-21 RX ADMIN — HYDROMORPHONE HYDROCHLORIDE PRN MG: 1 INJECTION, SOLUTION INTRAMUSCULAR; INTRAVENOUS; SUBCUTANEOUS at 23:48

## 2017-01-21 RX ADMIN — AMPICILLIN SCH MLS/HR: 2 INJECTION, POWDER, FOR SOLUTION INTRAVENOUS at 11:12

## 2017-01-21 RX ADMIN — INSULIN LISPRO SCH: 100 INJECTION, SOLUTION INTRAVENOUS; SUBCUTANEOUS at 18:10

## 2017-01-21 RX ADMIN — HYDROMORPHONE HYDROCHLORIDE PRN MG: 1 INJECTION, SOLUTION INTRAMUSCULAR; INTRAVENOUS; SUBCUTANEOUS at 15:28

## 2017-01-21 RX ADMIN — HEPARIN SODIUM SCH UNIT: 5000 INJECTION, SOLUTION INTRAVENOUS; SUBCUTANEOUS at 08:08

## 2017-01-21 RX ADMIN — HYDROMORPHONE HYDROCHLORIDE PRN MG: 1 INJECTION, SOLUTION INTRAMUSCULAR; INTRAVENOUS; SUBCUTANEOUS at 19:43

## 2017-01-21 RX ADMIN — HEPARIN SODIUM SCH UNIT: 5000 INJECTION, SOLUTION INTRAVENOUS; SUBCUTANEOUS at 00:43

## 2017-01-21 RX ADMIN — HEPARIN SODIUM SCH UNIT: 5000 INJECTION, SOLUTION INTRAVENOUS; SUBCUTANEOUS at 17:25

## 2017-01-21 RX ADMIN — AMPICILLIN SCH MLS/HR: 2 INJECTION, POWDER, FOR SOLUTION INTRAVENOUS at 08:09

## 2017-01-21 RX ADMIN — AMPICILLIN SCH MLS/HR: 2 INJECTION, POWDER, FOR SOLUTION INTRAVENOUS at 23:48

## 2017-01-21 RX ADMIN — AMPICILLIN SCH MLS/HR: 2 INJECTION, POWDER, FOR SOLUTION INTRAVENOUS at 00:33

## 2017-01-21 RX ADMIN — Medication SCH MG: at 21:01

## 2017-01-21 RX ADMIN — CITALOPRAM HYDROBROMIDE SCH MG: 20 TABLET ORAL at 21:01

## 2017-01-21 RX ADMIN — INSULIN LISPRO SCH: 100 INJECTION, SOLUTION INTRAVENOUS; SUBCUTANEOUS at 13:59

## 2017-01-21 RX ADMIN — TAMSULOSIN HYDROCHLORIDE SCH MG: 0.4 CAPSULE ORAL at 21:01

## 2017-01-21 RX ADMIN — CEFAZOLIN SCH MLS/HR: 330 INJECTION, POWDER, FOR SOLUTION INTRAMUSCULAR; INTRAVENOUS at 08:09

## 2017-01-21 RX ADMIN — INSULIN LISPRO SCH: 100 INJECTION, SOLUTION INTRAVENOUS; SUBCUTANEOUS at 00:42

## 2017-01-21 RX ADMIN — Medication SCH MG: at 08:08

## 2017-01-21 RX ADMIN — HEPARIN SODIUM SCH UNIT: 5000 INJECTION, SOLUTION INTRAVENOUS; SUBCUTANEOUS at 23:48

## 2017-01-21 RX ADMIN — HYDROMORPHONE HYDROCHLORIDE PRN MG: 1 INJECTION, SOLUTION INTRAMUSCULAR; INTRAVENOUS; SUBCUTANEOUS at 05:55

## 2017-01-21 NOTE — P.PN
Subjective


Principal diagnosis: 





doing well. 





Objective





- Vital Signs


Vital signs: 


 Vital Signs











Temp  96.4 F L  01/21/17 07:00


 


Pulse  101 H  01/21/17 07:00


 


Resp  20   01/21/17 07:00


 


BP  128/61   01/21/17 07:00


 


Pulse Ox  94 L  01/21/17 07:00








 Intake & Output











 01/20/17 01/21/17 01/21/17





 18:59 06:59 18:59


 


Intake Total  35 


 


Output Total 200 125 200


 


Balance -200 -90 -200


 


Weight 78 kg 86.5 kg 


 


Intake:   


 


  Tube Feeding  35 


 


Output:   


 


  Urine 100  200


 


  Stool 100 125 


 


Other:   


 


  Voiding Method Urinal Urinal 





 Diaper Diaper 


 


  # Voids 1 2 


 


  # Bowel Movements 1 1 








 ABP, PAP, CO, CI - Last Documented











Arterial Blood Pressure        93/39

















- Constitutional


General appearance: Present: cooperative, no acute distress





- Respiratory


Respiratory: bilateral: CTA





- Cardiovascular


Rhythm: regular


Heart sounds: normal: S1, S2





- Peripheral edema


  ** leg


Peripheral Edema: bilateral: None





- Gastrointestinal


General gastrointestinal: Present: soft





- Labs


CBC & Chem 7: 


 01/19/17 09:23





 01/21/17 08:03


Labs: 


 Abnormal Lab Results - Last 24 Hours (Table)











  01/20/17 01/20/17 01/21/17 Range/Units





  07:58 18:10 00:36 


 


Potassium  3.2 L    (3.5-5.1)  mmol/L


 


Chloride  114 H    ()  mmol/L


 


Carbon Dioxide  20 L    (22-30)  mmol/L


 


BUN  7 L    (9-20)  mg/dL


 


Glucose     (74-99)  mg/dL


 


POC Glucose (mg/dL)   104 H  103 H  (75-99)  mg/dL


 


Calcium  8.1 L    (8.4-10.2)  mg/dL


 


Ionized Calcium Alexis  5.5 H    (4.5-5.3)  mg/dL


 


Magnesium     (1.6-2.3)  mg/dL














  01/21/17 01/21/17 Range/Units





  06:01 08:03 


 


Potassium   3.1 L  (3.5-5.1)  mmol/L


 


Chloride   112 H  ()  mmol/L


 


Carbon Dioxide    (22-30)  mmol/L


 


BUN   6 L  (9-20)  mg/dL


 


Glucose   104 H  (74-99)  mg/dL


 


POC Glucose (mg/dL)  107 H   (75-99)  mg/dL


 


Calcium   8.2 L  (8.4-10.2)  mg/dL


 


Ionized Calcium Alexis    (4.5-5.3)  mg/dL


 


Magnesium   1.4 L  (1.6-2.3)  mg/dL








 Microbiology - Last 24 Hours (Table)











 01/18/17 11:34 Blood Culture - Preliminary





 Blood    No Growth after 48 hours














Assessment and Plan


Plan: 





#1.  Mild hypercalcemia likely related to volume contraction as well as calcium 

supplementation in TPN.  


--improved. Awaiting todays ionized calcium


--Workup for other causes negative so far. Awaiting PTHrP. 


--Heplock IV


#2.  Sepsis secondary to enterococcus bacteremia.  


#3.  Hyperchloremic acidosis secondary to IV fluids.


--REsolved. 


#4.  Anemia.  Hemoglobin improved status post packed red blood cell transition.


#5.  Hypokalemia secondary to lack of oral intake.  Magnesium replete.


--REsolved.

## 2017-01-21 NOTE — PN
DATE OF SERVICE: 01/20/2017



Reason for follow-up:  VRE bacteremia secondary to PICC line infection. 



INTERVAL HISTORY: The patient is afebrile. He has been breathing 

comfortably.  He has been started on his tube feeds.  His oral intake 

has improved as well.    Denies any chest pain or shortness of breath 

or cough.  



On examination, blood pressure 133/72 with a pulse of 98, temperature 

97.5. He is 95% on room air. General description is an elderly male, 

lying in bed in no distress.  

RESPIRATORY SYSTEM: Unlabored breathing. Clear to auscultation 

anteriorly.  

HEART: S1, S2 with regular rate and rhythm. 

ABDOMEN: Soft. No tenderness. 



LABS: A BUN of 7, creatinine 0.70. Blood cultures repeated on 01/18 

are negative so far.  



DIAGNOSTIC IMPRESSION AND PLAN: Patient with VRE bacteremia secondary 

to the PICC line infection that has been discontinued. The patient is 

currently on ampicillin. central line has been discontinued. Did have a 

peripheral IV.  Pt was discussed with case management if the 

patient can be maintained on the ampicillin through peripheral IV at 

the nursing home, so we would not have to place another PICC line.  

The duration of antibiotics will be 2 weeks from his negative blood 

cultures that should be the second of February. Continue supportive 

care.  



NICOLLE

## 2017-01-21 NOTE — P.PN
Progress Note - Text





The patient is stable.  His PEG tube site is clean dry and intact.  His abdomen 

soft.





Patient will start tube feeds for chronic malnutrition.

## 2017-01-22 LAB
ANION GAP SERPL CALC-SCNC: 8 MMOL/L
BUN SERPL-SCNC: 6 MG/DL (ref 9–20)
CALCIUM SPEC-MCNC: 8.2 MG/DL (ref 8.4–10.2)
CHLORIDE SERPL-SCNC: 112 MMOL/L (ref 98–107)
CO2 SERPL-SCNC: 20 MMOL/L (ref 22–30)
GLUCOSE BLD-MCNC: 102 MG/DL (ref 75–99)
GLUCOSE BLD-MCNC: 105 MG/DL (ref 75–99)
GLUCOSE BLD-MCNC: 111 MG/DL (ref 75–99)
GLUCOSE BLD-MCNC: 121 MG/DL (ref 75–99)
GLUCOSE BLD-MCNC: 99 MG/DL (ref 75–99)
GLUCOSE SERPL-MCNC: 92 MG/DL (ref 74–99)
MAGNESIUM SPEC-SCNC: 1.5 MG/DL (ref 1.6–2.3)
NON-AFRICAN AMERICAN GFR(MDRD): >60
POTASSIUM SERPL-SCNC: 3.6 MMOL/L (ref 3.5–5.1)
SODIUM SERPL-SCNC: 140 MMOL/L (ref 137–145)

## 2017-01-22 RX ADMIN — HYDROMORPHONE HYDROCHLORIDE PRN MG: 1 INJECTION, SOLUTION INTRAMUSCULAR; INTRAVENOUS; SUBCUTANEOUS at 08:01

## 2017-01-22 RX ADMIN — INSULIN LISPRO SCH: 100 INJECTION, SOLUTION INTRAVENOUS; SUBCUTANEOUS at 17:15

## 2017-01-22 RX ADMIN — HEPARIN SODIUM SCH UNIT: 5000 INJECTION, SOLUTION INTRAVENOUS; SUBCUTANEOUS at 23:18

## 2017-01-22 RX ADMIN — AMPICILLIN SCH MLS/HR: 2 INJECTION, POWDER, FOR SOLUTION INTRAVENOUS at 12:51

## 2017-01-22 RX ADMIN — HEPARIN SODIUM SCH UNIT: 5000 INJECTION, SOLUTION INTRAVENOUS; SUBCUTANEOUS at 15:20

## 2017-01-22 RX ADMIN — PANTOPRAZOLE SODIUM SCH MG: 40 TABLET, DELAYED RELEASE ORAL at 07:51

## 2017-01-22 RX ADMIN — AMPICILLIN SCH MLS/HR: 2 INJECTION, POWDER, FOR SOLUTION INTRAVENOUS at 07:51

## 2017-01-22 RX ADMIN — HYDROMORPHONE HYDROCHLORIDE PRN MG: 1 INJECTION, SOLUTION INTRAMUSCULAR; INTRAVENOUS; SUBCUTANEOUS at 03:52

## 2017-01-22 RX ADMIN — HYDROMORPHONE HYDROCHLORIDE PRN MG: 1 INJECTION, SOLUTION INTRAMUSCULAR; INTRAVENOUS; SUBCUTANEOUS at 15:20

## 2017-01-22 RX ADMIN — HYDROMORPHONE HYDROCHLORIDE PRN MG: 1 INJECTION, SOLUTION INTRAMUSCULAR; INTRAVENOUS; SUBCUTANEOUS at 20:24

## 2017-01-22 RX ADMIN — INSULIN LISPRO SCH: 100 INJECTION, SOLUTION INTRAVENOUS; SUBCUTANEOUS at 06:00

## 2017-01-22 RX ADMIN — AMPICILLIN SCH MLS/HR: 2 INJECTION, POWDER, FOR SOLUTION INTRAVENOUS at 23:18

## 2017-01-22 RX ADMIN — HEPARIN SODIUM SCH UNIT: 5000 INJECTION, SOLUTION INTRAVENOUS; SUBCUTANEOUS at 07:51

## 2017-01-22 RX ADMIN — INSULIN LISPRO SCH: 100 INJECTION, SOLUTION INTRAVENOUS; SUBCUTANEOUS at 12:52

## 2017-01-22 RX ADMIN — CITALOPRAM HYDROBROMIDE SCH MG: 20 TABLET ORAL at 20:16

## 2017-01-22 RX ADMIN — AMPICILLIN SCH MLS/HR: 2 INJECTION, POWDER, FOR SOLUTION INTRAVENOUS at 03:52

## 2017-01-22 RX ADMIN — CEFAZOLIN SCH: 330 INJECTION, POWDER, FOR SOLUTION INTRAMUSCULAR; INTRAVENOUS at 03:54

## 2017-01-22 RX ADMIN — CEFAZOLIN SCH MLS/HR: 330 INJECTION, POWDER, FOR SOLUTION INTRAMUSCULAR; INTRAVENOUS at 23:23

## 2017-01-22 RX ADMIN — AMPICILLIN SCH MLS/HR: 2 INJECTION, POWDER, FOR SOLUTION INTRAVENOUS at 15:19

## 2017-01-22 RX ADMIN — INSULIN LISPRO SCH: 100 INJECTION, SOLUTION INTRAVENOUS; SUBCUTANEOUS at 00:16

## 2017-01-22 RX ADMIN — AMPICILLIN SCH MLS/HR: 2 INJECTION, POWDER, FOR SOLUTION INTRAVENOUS at 20:16

## 2017-01-22 RX ADMIN — TAMSULOSIN HYDROCHLORIDE SCH MG: 0.4 CAPSULE ORAL at 20:16

## 2017-01-22 NOTE — PN
DATE OF SERVICE: 01/21/2017 



REASON FOR FOLLOW-UP: 

1. VRE bacteremia secondary to PICC line infection.

2. Stage II sacral pressure ulcer. 



INTERVAL HISTORY: The patient is afebrile. He has been breathing 

comfortably, tolerating his tube feeds. His oral intake has improved 

as well. Denies any chest pain or shortness of breath or cough. The 

nurse aide did notice a wound on his sacral area, but the patient had no 

symptoms referable to the same.  



On examination, blood pressure is 140/67 with a pulse of 91, 

temperature is 98. He is 95% on room air. General description is an 

elderly male, lying in bed in no distress.  

RESPIRATORY SYSTEM: Unlabored breathing. Clear to auscultation 

anteriorly.  

HEART: S1, S2 regular rate and rhythm. 

ABDOMEN: Soft, no tenderness. 

He did have stage II wound on his sacral area with no significant 

cellulitis or slough tissue.  



LABS: BUN of 6, creatinine 0.72. Blood cultures 1/18 negative. 



DIAGNOSTIC IMPRESSION AND PLAN: 

1. Patient with vancomycin-resistant enterococcus bacteremia. Source 

is peripherally inserted central catheter line. This has been 

discontinued. Follow up blood cultures 1/18 negative. The patient will 

continue on ampicillin and will see if this can be done as an 

outpatient with peripheral IV so we can avoid placing another 

peripherally inserted central catheter line.  

2. Patient with sacral wound. We will apply Aquacel Silver dressing 

and keep the area off the pressure.  



NICOLLE

## 2017-01-22 NOTE — PN
DATE OF SERVICE: 01/22/2017 



Patient seen, evaluated, and examined; clinically doing well, awake 

and alert. The patient has been tolerating tube feeds well. The 

breathing comfortably. The patient's repeat cultures have been 

negative. Renal service and Infectious Disease has been following this 

patient as well. Hemodynamic status is stable.  



Last of vitals include blood pressure 136/70, respiratory rate 16, 

pulse 96, temperature 98, sating 96%.  

HEENT: Unremarkable. 

NECK: Supple. 

LUNGS: Good air entry bilaterally. 

HEART: Regular rate and rhythm. 

ABDOMEN: Soft. 

NEUROLOGICAL EXAMINATION: Otherwise, awake and alert. 



Labs reviewed. Medications reviewed as well. 



Sodium is 140, potassium 3.6, BUN 6, creatinine 0.7. 



IMPRESSION: 

1. Enterococcal bacteremia related to PICC line. Repeat culture is 

negative. PICC line is off.  

2. Protein calorie malnourishment. The patient is getting tube feed 

via PEG, tolerating well.  

3. History of recent lung cancer. 

4. Severe chronic obstructive pulmonary disease and emphysema. 



PLAN AND RECOMMENDATIONS: As above. Continue supportive care. Increase 

activity as tolerated. Follow clinical course closely. Further 

recommendations to follow. Plan of care as per clinical response of 

the patient.

## 2017-01-22 NOTE — P.PN
Progress Note - Text





The patient is resting comfortably bed.





On exam his vital signs are stable.  His abdomen is soft.  PEG tube site is 

clean.





The patient will continue tube feeds for his protein calorie malnutrition.

## 2017-01-22 NOTE — PN
DATE OF SERVICE: 01/21/2017 



Suraj Diallo is a 71-year-old male, seen, evaluated, examined. Mr. Suraj Diallo is a 71-year-old. His PEG tube is working now. Patient is 

tolerating tube feeds well. He is awake, alert. Wife is present at 

bedside.  



Last set of vitals include blood pressure is 122/65, respirations 17, 

pulse 96, temperature 97, saturation 95% to 96%.  

HEENT: Unremarkable. 

NECK: Supple. 

LUNGS: Good air entry bilaterally. 

HEART: Regular rate and rhythm. S1 and S2 audible. 

ABDOMEN: Soft. No rebound or rigidity. 

EXTREMITIES: +1 peripheral pulses. 

NEUROLOGICAL EXAMINATION: Otherwise, awake and alert. 



IMPRESSION: 

1. Vancomycin-resistant enterococcus bacteremia related to PICC. 

2. History of recent lung cancer, status post radiation therapy. 

3. Ulcerative colitis, status post colectomy. 

4. Protein calorie malnourishment. 



Plan and recommendation is as above. Continue supportive care. Follow 

clinical course closely. Increase activity as tolerated. Continue 

antibiotics. Continue deep breathing exercises, incentive spirometry.

## 2017-01-22 NOTE — P.PN
Subjective





Doing well. Tube feeds started. F/U for Hypercalcemia. He had low mag and 

hypokalemia as well. Calcium overall better. Ionized Ca 5.4 yesterday. workup 

negative so far PTHrP is pending. 





Objective





- Vital Signs


Vital signs: 


 Vital Signs











Temp  96.6 F L  01/22/17 07:00


 


Pulse  96   01/22/17 07:00


 


Resp  18   01/22/17 07:00


 


BP  136/70   01/22/17 07:00


 


Pulse Ox  96   01/22/17 07:00








 Intake & Output











 01/21/17 01/22/17 01/22/17





 18:59 06:59 18:59


 


Intake Total 35 105 


 


Output Total 200  


 


Balance -165 105 


 


Weight  86 kg 


 


Intake:   


 


  Tube Feeding 35 105 


 


Output:   


 


  Urine 200  


 


Other:   


 


  Voiding Method Urinal Urinal Urinal





 Diaper Diaper Diaper








 ABP, PAP, CO, CI - Last Documented











Arterial Blood Pressure        93/39

















- Constitutional


General appearance: Present: no acute distress





- Respiratory


Respiratory: bilateral: CTA





- Cardiovascular


Rhythm: regular


Heart sounds: normal: S1, S2





- Peripheral edema


  ** leg


Peripheral Edema: bilateral: None





- Gastrointestinal


General gastrointestinal: Present: soft





- Labs


CBC & Chem 7: 


 01/19/17 09:23





 01/21/17 08:03


Labs: 


 Abnormal Lab Results - Last 24 Hours (Table)











  01/21/17 01/21/17 01/22/17 Range/Units





  08:03 17:32 00:07 


 


Potassium  3.1 L    (3.5-5.1)  mmol/L


 


Chloride  112 H    ()  mmol/L


 


BUN  6 L    (9-20)  mg/dL


 


Glucose  104 H    (74-99)  mg/dL


 


POC Glucose (mg/dL)   104 H  102 H  (75-99)  mg/dL


 


Calcium  8.2 L    (8.4-10.2)  mg/dL


 


Ionized Calcium Alexis  5.4 H    (4.5-5.3)  mg/dL


 


Magnesium  1.4 L    (1.6-2.3)  mg/dL














  01/22/17 Range/Units





  05:53 


 


Potassium   (3.5-5.1)  mmol/L


 


Chloride   ()  mmol/L


 


BUN   (9-20)  mg/dL


 


Glucose   (74-99)  mg/dL


 


POC Glucose (mg/dL)  105 H  (75-99)  mg/dL


 


Calcium   (8.4-10.2)  mg/dL


 


Ionized Calcium Alexis   (4.5-5.3)  mg/dL


 


Magnesium   (1.6-2.3)  mg/dL








 Microbiology - Last 24 Hours (Table)











 01/18/17 11:34 Blood Culture - Preliminary





 Blood    No Growth after 72 hours














Assessment and Plan


Plan: 








#1.  Mild hypercalcemia likely related to volume contraction as well as calcium 

supplementation in TPN.  


--improved overall but yesterday ionized Ca slightly above normal 5.4. 


--Await am labs. If ionized calcium continues to rise, will add calcitonin/IVFs 


--Workup for other causes negative so far. Awaiting PTHrP. 


#2.  Sepsis secondary to enterococcus bacteremia.  


#4.  Anemia.  Hemoglobin improved status post packed red blood cell transition.


#5.  Hypokalemia and hypomagnesemia due to malnutrition. replaced yesterday. 

LAbs pending today.

## 2017-01-23 VITALS
TEMPERATURE: 96.8 F | SYSTOLIC BLOOD PRESSURE: 162 MMHG | DIASTOLIC BLOOD PRESSURE: 73 MMHG | RESPIRATION RATE: 20 BRPM | HEART RATE: 94 BPM

## 2017-01-23 LAB
ANION GAP SERPL CALC-SCNC: 8 MMOL/L
BUN SERPL-SCNC: 6 MG/DL (ref 9–20)
CALCIUM SPEC-MCNC: 8.2 MG/DL (ref 8.4–10.2)
CHLORIDE SERPL-SCNC: 109 MMOL/L (ref 98–107)
CO2 SERPL-SCNC: 23 MMOL/L (ref 22–30)
GLUCOSE BLD-MCNC: 91 MG/DL (ref 75–99)
GLUCOSE BLD-MCNC: 95 MG/DL (ref 75–99)
GLUCOSE BLD-MCNC: 96 MG/DL (ref 75–99)
GLUCOSE SERPL-MCNC: 99 MG/DL (ref 74–99)
NON-AFRICAN AMERICAN GFR(MDRD): >60
POTASSIUM SERPL-SCNC: 3.6 MMOL/L (ref 3.5–5.1)
SODIUM SERPL-SCNC: 140 MMOL/L (ref 137–145)
TRIGL SERPL-MCNC: 97 MG/DL (ref ?–150)

## 2017-01-23 RX ADMIN — HEPARIN SODIUM SCH: 5000 INJECTION, SOLUTION INTRAVENOUS; SUBCUTANEOUS at 16:08

## 2017-01-23 RX ADMIN — AMPICILLIN SCH MLS/HR: 2 INJECTION, POWDER, FOR SOLUTION INTRAVENOUS at 11:55

## 2017-01-23 RX ADMIN — INSULIN LISPRO SCH: 100 INJECTION, SOLUTION INTRAVENOUS; SUBCUTANEOUS at 02:30

## 2017-01-23 RX ADMIN — INSULIN LISPRO SCH: 100 INJECTION, SOLUTION INTRAVENOUS; SUBCUTANEOUS at 12:47

## 2017-01-23 RX ADMIN — AMPICILLIN SCH: 2 INJECTION, POWDER, FOR SOLUTION INTRAVENOUS at 17:50

## 2017-01-23 RX ADMIN — INSULIN LISPRO SCH: 100 INJECTION, SOLUTION INTRAVENOUS; SUBCUTANEOUS at 17:50

## 2017-01-23 RX ADMIN — AMPICILLIN SCH MLS/HR: 2 INJECTION, POWDER, FOR SOLUTION INTRAVENOUS at 05:34

## 2017-01-23 RX ADMIN — HEPARIN SODIUM SCH UNIT: 5000 INJECTION, SOLUTION INTRAVENOUS; SUBCUTANEOUS at 08:11

## 2017-01-23 RX ADMIN — INSULIN LISPRO SCH: 100 INJECTION, SOLUTION INTRAVENOUS; SUBCUTANEOUS at 06:27

## 2017-01-23 RX ADMIN — PANTOPRAZOLE SODIUM SCH MG: 40 TABLET, DELAYED RELEASE ORAL at 08:10

## 2017-01-23 NOTE — P.PN
Progress Note - Text





The patient is doing well.  He is tolerating his tube feeds.  His abdomen soft.





Patiently discharged home today he'll follow-up as an outpatient.

## 2017-01-23 NOTE — P.PN
Subjective





Patient is seen in follow-up for hypercalcemia.  He is noted to have a high 

ionized calcium level.  Ionized calcium peaked at 6.3.  He did receive 1 dose 

of calcitonin and ionized calcium did come down to the normal range.  Workup so 

far for hypercalcemia has been quite benign.  He is currently being treated for 

VRE bacteremia.  Denies chest pain or shortness of breath.  His renal function 

is at baseline and he is nonoliguric.  No vomiting or diarrhea.  PEG tube was 

placed this admission and is currently receiving tube feeds.





Vital signs are stable.


General: The patient appeared well nourished and normally developed. 


HEENT: Head exam is unremarkable. Neck is without jugular venous distension.


LUNGS: Lungs are clear to auscultation and percussion. Breath sounds decreased.


HEART: Rate and Rhythm are regular. First and second heart sounds normal. No 

murmurs, rubs or gallops. 


ABDOMEN: Abdominal exam reveals normal bowel sounds. Non-tender and non-

distended. No evidence of peritonitis.


EXTREMITITES: No clubbing, cyanosis, or edema.





Objective





- Vital Signs


Vital signs: 


 Vital Signs











Temp  96.8 F L  01/23/17 07:00


 


Pulse  94   01/23/17 07:00


 


Resp  20   01/23/17 07:00


 


BP  162/73   01/23/17 07:00


 


Pulse Ox  95   01/23/17 07:00








 Intake & Output











 01/22/17 01/23/17 01/23/17





 18:59 06:59 18:59


 


Intake Total 500 420 


 


Output Total 100 150 


 


Balance 400 270 


 


Weight  86.5 kg 


 


Intake:   


 


  Oral 220  


 


  Tube Feeding 280 420 


 


Output:   


 


  Urine  100 


 


  Stool 100 50 


 


Other:   


 


  Voiding Method Urinal Urinal 





 Diaper Diaper 


 


  # Voids  1 








 ABP, PAP, CO, CI - Last Documented











Arterial Blood Pressure        93/39

















- Labs


CBC & Chem 7: 


 01/19/17 09:23





 01/23/17 09:30


Labs: 


 Abnormal Lab Results - Last 24 Hours (Table)











  01/22/17 01/22/17 01/23/17 Range/Units





  12:31 16:29 09:30 


 


Chloride    109 H  ()  mmol/L


 


BUN    6 L  (9-20)  mg/dL


 


POC Glucose (mg/dL)  111 H  121 H   (75-99)  mg/dL


 


Calcium    8.2 L  (8.4-10.2)  mg/dL


 


Albumin    2.0 L  (3.5-5.0)  g/dL








 Microbiology - Last 24 Hours (Table)











 01/18/17 11:34 Blood Culture - Preliminary





 Blood    No Growth after 96 hours














Assessment and Plan


Plan: 





Assessment:


#1.  Mild hypercalcemia likely related to volume contraction as well as calcium 

supplementation in TPN.  Improved.  Status post 1 dose of calcitonin.  Will 

also rule out other causes such as hyperparathyroidism, vitamin D excess, as 

well as malignancy - vitamin D level is normal.  125 dihydroxy vitamin D3 level 

is actually on the lower end.  PTH is suppressed.  No proteinuria on UA.  [place]/

lambda ratio is intact.  Follow-up PTH related peptide.


#2.  Sepsis secondary to enterococcus bacteremia.  


#3.  Hyperchloremic acidosis secondary to IV fluids.  Improved.


#4.  Anemia.  Hemoglobin improved status post packed red blood cell transition.


#5.  Hypokalemia secondary to lack of oral intake.  Magnesium deficiency also a 

contributing factor.





Plan:


 Maintain tube feeds.


Follow-up PTH related peptide.


Antibiotics per infectious disease recommendations.


Maintain oral sodium bicarbonate supplementation.


Replace potassium.  40 mEq once today.


Stable to be discharged to [facility] from nephrology standpoint.  He will need to 

follow-up as an outpatient in the next 2 weeks.

## 2017-01-23 NOTE — PN
DATE OF SERVICE: 01/23/2017



Reason for follow-up:  VRE bacteremia secondary to PICC line infection. 



INTERVAL HISTORY: The patient is afebrile, has been breathing 

comfortably, tolerating his tube feeds. No significant nausea and 

vomiting. No diarrhea.  No gastric upset.    



On examination, blood pressure is 162/73 with a pulse of 94, 

temperature is 96.8, he is 95% on room air. General description is an 

elderly male, lying in bed in no distress.  

RESPIRATORY SYSTEM: Unlabored breathing. Clear to auscultation 

anteriorly.  

HEART: S1, S2 regular rate and rhythm. 

ABDOMEN: Soft, no tenderness. 



LABS: A BUN of 30, creatinine  0.60. 



DIAGNOSTIC IMPRESSION AND PLAN: Patient with VRE bacteremia secondary 

to PICC line infection. Follow-up blood cultures on 01/18 has been 

negative. Patient currently on ampicillin.   



PLAN:  We will another 10 days of ampicillin 2 grams q6h through 

the peripheral IV.  Will hold on placing any further PICC line to decrease risk 
of line  

 infection, once antibiotic arranged he should be able to go back to 

nursing home from infectious disease standpoint.  



NICOLLE

## 2017-01-23 NOTE — PN
DATE OF SERVICE:  01/22/2017 



Reason for followup is VRE bacteremia. 



INTERVAL HISTORY: The patient continues to be afebrile.  Has been 

breathing comfortably. Denies any significant chest pain or shortness 

of breath or cough. No abdominal pain.  Tolerating his tube feed.  No 

nausea, vomiting.  



On examination, blood pressure is 149/76 with pulse of 92, temperature 

98.9. He is 95% on room air. General description is an elderly male, 

lying in bed in no distress.  

RESPIRATORY SYSTEM: Unlabored breathing. Clear to auscultation. 

HEART: S1, S2. Regular rate and rhythm. 

ABDOMEN: Soft, no tenderness. 



LABS: BUN of 6, creatinine 0.70. Blood culture on 1/18 continues to be 

negative.   



DIAGNOSTIC IMPRESSION AND PLAN: 

1. Patient with a VRE bacteremia secondary to the PICC line that has 

been discontinued. Her blood culture from 1/18 has been negative. The 

patient is continued on ampicillin 2 gm which will be switched to

q6hr  for a total of 2 weeks from his negative blood culture with at last 

finishing his negative antibiotic therapy as of 2/02/2017.  We will 

discuss with the  if antibiotics should be done through 

the peripheral IV so to prevent placement of PICC line.  

2. Patient will sacral wound.  Continue with Aquacel Silver packing 

and keep the area off the pressure.  



TIFFANIED

## 2017-01-23 NOTE — P.DS
Providers


Date of admission: 


01/16/17 07:21





Expected date of discharge: 01/23/17


Attending physician: 


Ricky Davis





Consults: 





 





01/16/17 11:11


Consult Physician Urgent 


   Consulting Provider: Tasha Bryant


   Consult Reason/Comments: icu management


   Do you want consulting provider notified?: Already Contacted





01/17/17 06:15


Consult Physician Urgent 


   Consulting Provider: Ramandeep Moreira


   Consult Reason/Comments: critical ionozed Ca


   Do you want consulting provider notified?: Yes, Notify in am





01/18/17 11:38


Consult Physician Routine 


   Consulting Provider: Emanuel Diop


   Consult Reason/Comments: PEG tube placement


   Do you want consulting provider notified?: Yes











Primary care physician: 


[facility] Course: 





This is a 71-year-old male with a complex medical history.  The patient 

presented to the emergency department from [facility] for the patient 

was transferred after spending 2 weeks at [facility] specialty.  The patient was 

apparently having altered mental status and fevers.  The patient has a history 

of lung cancer status post resection on 09/08/2016.  The patient also has a 

history of Crohn's disease and had colectomy/ileostomy.  The patient has 

generalized weakness and protein calorie malnutrition.  He is on TPN 

chronically.  The patient has a history of Candida bacteremia and MSSA 

bacteremia.  A PICC line was placed at [facility].  The case is discussed 

with infectious disease and it is recommended the PICC line be removed.  The 

patient has a clear chest x-ray.  He has been hypotensive.  He has received 

multiple fluid boluses.  Patient needed to be transferred to the intensive care 

unit for closer monitoring.  Patient needed to be put on pressor support.  

Patient had 5 L of IV fluid resuscitation on admission.  Patient was noted to 

have blood cultures that were positive for group D in the coccus on admission 

the patient's calcium was elevated ionized calcium was 6.1 patient was treated 

for severe sepsis the source likely a PICC line infection with catheter tip 

positive forEnterococcus faecalis VRE and blood cultures positive for 

Enterococcus faecalis VRE.  Prior to admission, patient was on chronic TPN 

secondary to severe protein calorie malnutrition. Surgical consult has been 

requested for PEG tube placement for enteral feeding.





Patient was followed by multiple consulting physicians throughout the 

hospitalization and when stabilized was able to be transferred out of the 

intensive care unit was stepdown unit PT OT did participate in the plan of care 

and patient was followed by infectious disease with recommendations in regards 

to the antibiotics patient would be on Unasyn for 2 weeks which that would be 

completed on February 2.  The decision was confused the antibiotics per 

peripheral IV at the nursing home so the patient would not have to be placed on 

another PICC line





Impression discharge diagnosis


Present on admission nutritional anemia 


Severe protein calorie malnutrition


Present on admission severe sepsis due to PICC line infection with catheter tip 

culture positive for Enterococcus faecalis VRE and blood cultures positive for 

Enterococcus faecalis VRE.


. Severe protein calorie malnutrition with history of chronic TPN infusion With 

muscle wasting


. History of  subtotal colectomy with ileostomy secondary to ulcerative colitis 

on 12/09/2016.  


Mild hypercalcemia likely related to volume contraction as well as calcium 

supplementation in TPN.    


  Present on admission Sepsis secondary to enterococcus bacteremia.  


  Hyperchloremic acidosis secondary to IV fluids.


.  Anemia.  Hemoglobin improved status post packed red blood cell infused


  Hypokalemia secondary to lack of oral intake.  With  hypo-Magnesium 


Chronic physical debility due to chronic illness


History of lung cancer status post resection 2016


Present on admission clinical dehydration


History of compression fractures lumbar spine 2016


History of Crohn's disease status post colectomy ostomy


Depressive disorder nonspecified


Present on admission sacral decubitus ulcer stage II


Chronic debilitated due to chronic illness





The above dictated assessment and findings were discussed with dr susan Woodruff and the plan of care have been dictated as directed.  Shannan Ro 

nurse practitioner acting as a scribe for dr davis





Plan - Discharge Summary


New Discharge Prescriptions: 


Ampicillin-Sulbactam [Unasyn] 2 gm IVPB Q6HR #40 vial


HYDROcodone/APAP 7.5-325MG [Norco 7.5-325] 1 each PO Q4HR PRN #30 tab


 PRN Reason: Pain


Discharge Medication List





Ondansetron [Zofran] 4 mg PO Q6H PRN 11/14/16 [History]


Aspirin 81 mg PO DAILY  chew 12/22/16 [Rx]


Furosemide [Lasix] 20 mg PO DAILY  tab 12/22/16 [Rx]


Megestrol [Megace] 800 mg PO DAILY  cup 12/22/16 [Rx]


Acetaminophen Tab [Tylenol] 500 mg PO Q6HR PRN 01/16/17 [History]


Citalopram Hydrobromide [CeleXA] 20 mg PO HS 01/16/17 [History]


Ensure 1 can PO TID-W/MEALS 01/16/17 [History]


Fluconazole [Diflucan] 100 mg PO DAILY 01/16/17 [History]


HYDROcodone/APAP 7.5-325MG [Norco 7.5-325] 1 tab PO Q4H PRN 01/16/17 [History]


Heparin Sodium,Porcine [Heparin Sodium] 5,000 unit SQ Q12H 01/16/17 [History]


Insulin Regular [humuLIN R] See Protocol SQ Q6H 01/16/17 [History]


Metoprolol Tartrate [Lopressor] 25 mg PO BID 01/16/17 [History]


Tamsulosin HCl [Flomax] 0.4 mg PO HS 01/16/17 [History]


Thiamine [Vitamin B-1] 100 mg PO DAILY@0900 01/16/17 [History]


Velelex Oint 1 applic TOPICAL HS 01/16/17 [History]


Ampicillin-Sulbactam [Unasyn] 2 gm IVPB Q6HR #40 vial 01/23/17 [Rx]


HYDROcodone/APAP 7.5-325MG [Norco 7.5-325] 1 each PO Q4HR PRN #30 tab 01/23/17 [

Rx]


Pantoprazole [Protonix] 40 mg PO [id number]  tablet. 01/23/17 [Rx]








Follow up Appointment(s)/Referral(s): 


Ricky Davis MD [Primary Care Provider] - 1-2 days


[facility], [NON-STAFF] - As Needed


Radha Andrade MD [STAFF PHYSICIAN] - 1 Week


Activity/Diet/Wound Care/Special Instructions: 


Tube feeding instructions: Two mitchell HN continuous tube feeding @ goal of 35 ml/

hr with 110 ml free water flush wvery 4 hrs. Pt also on a Dysphagia diet with  

thin liquids


Discharge Disposition: TRANSFER TO SNF/ECF

## 2017-02-01 NOTE — P.PCN
Date of Procedure: 01/16/17


Preoperative Diagnosis: 


Septic shock, hemodynamic instability





Postoperative Diagnosis: 


same


Procedure(s) Performed: 


Right IJ central line placement, ultra sound guided


Anesthesia: local


Surgeon: Tasha Bryant


Estimated Blood Loss (ml): 2


Pathology: none sent


Disposition: ICU


Indications for Procedure: 


Need for vasopressors, septic shock, hemodynamic instability


Description of Procedure: 


Patient placed in trendelenburg, Right IJ identified with ultrasound.  Local 

anesthesia applied.  Needle inserted into IJ with return of dark red non-

pulsatile blood.  Guide wire fed, dilated. Triple lumen catheter fed over guide 

wire.  Ports draw back dark red blood, flushed.  Catheter sutured into place.   

No arrhythmias noted on monitor.  Patient tolerated procedure well.  Will 

obtain CXR for line placement. uncomplicated NV uncomplicated

## 2017-05-23 ENCOUNTER — HOSPITAL ENCOUNTER (OUTPATIENT)
Dept: HOSPITAL 47 - RADXRMAIN | Age: 72
End: 2017-05-23
Payer: MEDICARE

## 2017-05-23 DIAGNOSIS — M51.36: Primary | ICD-10-CM

## 2017-05-23 DIAGNOSIS — M46.86: ICD-10-CM

## 2017-05-23 PROCEDURE — 72100 X-RAY EXAM L-S SPINE 2/3 VWS: CPT

## 2017-05-23 NOTE — XR
Lumbar spine

 

HISTORY: Low back pain

 

3 views of the lumbar spine

 

Comparison previous exam 6 June 2016

 

Bone mineralization is reduced as on prior exam. Osteoporotic compression deformities present at supe
rior endplates of L2, T12 are suspected. Alignment is maintained. Dense atherosclerotic calcification
s are present within the aorta, the abdominal aorta may be ectatic. Loss of disc height L5-S1. Sclero
sis present in the posterior elements at the lumbosacral junction.

 

IMPRESSION: Osteoporosis, suspect chronic osteoporotic superior endplate fractures at multiple levels
. Mild degenerative disc disease, facet arthropathy, there may be abdominal aortic ectasia.

## 2017-07-31 ENCOUNTER — HOSPITAL ENCOUNTER (OUTPATIENT)
Dept: HOSPITAL 47 - RADXRMAIN | Age: 72
Discharge: HOME | End: 2017-07-31
Payer: MEDICARE

## 2017-07-31 DIAGNOSIS — R07.81: Primary | ICD-10-CM

## 2017-07-31 NOTE — XR
EXAMINATION TYPE: XR ribs LT

 

DATE OF EXAM: 7/31/2017

 

COMPARISON: Chest radiograph dated 1/16/2017

 

HISTORY: Left lower rib pain for 2 months with no known injury

 

TECHNIQUE: 2 views of the left ribs were obtained.

 

FINDINGS: There is no evidence of displaced fracture. There is generalized osteopenia with no focal a
santhosh of sclerosis. The visualized lungs are clear with left hilar surgical sutures noted.

 

IMPRESSION: No evidence of displaced rib fracture or suspicious osseous lesion.

## 2017-08-21 ENCOUNTER — HOSPITAL ENCOUNTER (INPATIENT)
Dept: HOSPITAL 47 - 5MS5E | Age: 72
LOS: 4 days | Discharge: HOME | DRG: 385 | End: 2017-08-25
Payer: MEDICARE

## 2017-08-21 DIAGNOSIS — R33.8: ICD-10-CM

## 2017-08-21 DIAGNOSIS — Z82.0: ICD-10-CM

## 2017-08-21 DIAGNOSIS — E43: ICD-10-CM

## 2017-08-21 DIAGNOSIS — Z93.3: ICD-10-CM

## 2017-08-21 DIAGNOSIS — Z87.891: ICD-10-CM

## 2017-08-21 DIAGNOSIS — N32.9: ICD-10-CM

## 2017-08-21 DIAGNOSIS — K51.213: Primary | ICD-10-CM

## 2017-08-21 DIAGNOSIS — M81.0: ICD-10-CM

## 2017-08-21 DIAGNOSIS — Z90.49: ICD-10-CM

## 2017-08-21 DIAGNOSIS — F32.9: ICD-10-CM

## 2017-08-21 DIAGNOSIS — Z79.899: ICD-10-CM

## 2017-08-21 DIAGNOSIS — N40.1: ICD-10-CM

## 2017-08-21 DIAGNOSIS — Z85.118: ICD-10-CM

## 2017-08-21 DIAGNOSIS — J44.9: ICD-10-CM

## 2017-08-21 LAB
ALP SERPL-CCNC: 158 U/L (ref 38–126)
ALT SERPL-CCNC: 26 U/L (ref 21–72)
ANION GAP SERPL CALC-SCNC: 10 MMOL/L
AST SERPL-CCNC: 26 U/L (ref 17–59)
BASOPHILS # BLD AUTO: 0.1 K/UL (ref 0–0.2)
BASOPHILS NFR BLD AUTO: 1 %
BUN SERPL-SCNC: 12 MG/DL (ref 9–20)
CALCIUM SPEC-MCNC: 9.2 MG/DL (ref 8.4–10.2)
CH: 30.3
CHCM: 32.3
CHLORIDE SERPL-SCNC: 106 MMOL/L (ref 98–107)
CO2 SERPL-SCNC: 24 MMOL/L (ref 22–30)
EOSINOPHIL # BLD AUTO: 0.4 K/UL (ref 0–0.7)
EOSINOPHIL NFR BLD AUTO: 5 %
ERYTHROCYTE [DISTWIDTH] IN BLOOD BY AUTOMATED COUNT: 4.69 M/UL (ref 4.3–5.9)
ERYTHROCYTE [DISTWIDTH] IN BLOOD: 15.2 % (ref 11.5–15.5)
GLUCOSE SERPL-MCNC: 107 MG/DL (ref 74–99)
HCT VFR BLD AUTO: 44.2 % (ref 39–53)
HDW: 2.57
HGB BLD-MCNC: 14.4 GM/DL (ref 13–17.5)
LUC NFR BLD AUTO: 3 %
LYMPHOCYTES # SPEC AUTO: 2.1 K/UL (ref 1–4.8)
LYMPHOCYTES NFR SPEC AUTO: 24 %
MCH RBC QN AUTO: 30.6 PG (ref 25–35)
MCHC RBC AUTO-ENTMCNC: 32.5 G/DL (ref 31–37)
MCV RBC AUTO: 94.3 FL (ref 80–100)
MONOCYTES # BLD AUTO: 0.6 K/UL (ref 0–1)
MONOCYTES NFR BLD AUTO: 7 %
NEUTROPHILS # BLD AUTO: 5.2 K/UL (ref 1.3–7.7)
NEUTROPHILS NFR BLD AUTO: 60 %
NON-AFRICAN AMERICAN GFR(MDRD): >60
POTASSIUM SERPL-SCNC: 4.5 MMOL/L (ref 3.5–5.1)
PROT SERPL-MCNC: 6.7 G/DL (ref 6.3–8.2)
SODIUM SERPL-SCNC: 140 MMOL/L (ref 137–145)
WBC # BLD AUTO: 0.25 10*3/UL
WBC # BLD AUTO: 8.6 K/UL (ref 3.8–10.6)
WBC (PEROX): 8.82

## 2017-08-21 PROCEDURE — 74020: CPT

## 2017-08-21 PROCEDURE — 45331 SIGMOIDOSCOPY AND BIOPSY: CPT

## 2017-08-21 PROCEDURE — 74176 CT ABD & PELVIS W/O CONTRAST: CPT

## 2017-08-21 PROCEDURE — 80053 COMPREHEN METABOLIC PANEL: CPT

## 2017-08-21 PROCEDURE — 85025 COMPLETE CBC W/AUTO DIFF WBC: CPT

## 2017-08-21 PROCEDURE — 81003 URINALYSIS AUTO W/O SCOPE: CPT

## 2017-08-21 PROCEDURE — 88305 TISSUE EXAM BY PATHOLOGIST: CPT

## 2017-08-21 PROCEDURE — 45300 PROCTOSIGMOIDOSCOPY DX: CPT

## 2017-08-21 RX ADMIN — CEFAZOLIN SCH MLS/HR: 330 INJECTION, POWDER, FOR SOLUTION INTRAMUSCULAR; INTRAVENOUS at 16:55

## 2017-08-21 RX ADMIN — HEPARIN SODIUM SCH: 5000 INJECTION, SOLUTION INTRAVENOUS; SUBCUTANEOUS at 16:56

## 2017-08-21 RX ADMIN — FAMOTIDINE SCH MG: 10 INJECTION, SOLUTION INTRAVENOUS at 20:06

## 2017-08-21 NOTE — XR
EXAMINATION TYPE: XR abdomen 2V

 

DATE OF EXAM: 8/21/2017

 

COMPARISON: NONE

 

INDICATION: Rectal bleeding

 

TECHNIQUE: Abdomen examined in the supine and upright view

 

FINDINGS:  

No suspicious bowel gas is evident. No free air is evident. No differential air-fluid levels are pres
ent. Colostomy is in the midabdomen.

Psoas margins are normal.

No organomegaly is present.

 

 

IMPRESSION: 

1. Normal post colostomy abdomen.

## 2017-08-22 LAB
BASOPHILS # BLD AUTO: 0.1 K/UL (ref 0–0.2)
BASOPHILS NFR BLD AUTO: 1 %
CH: 30.5
CHCM: 32.8
EOSINOPHIL # BLD AUTO: 0.4 K/UL (ref 0–0.7)
EOSINOPHIL NFR BLD AUTO: 5 %
ERYTHROCYTE [DISTWIDTH] IN BLOOD BY AUTOMATED COUNT: 4.55 M/UL (ref 4.3–5.9)
ERYTHROCYTE [DISTWIDTH] IN BLOOD: 15.3 % (ref 11.5–15.5)
HCT VFR BLD AUTO: 42.5 % (ref 39–53)
HDW: 2.6
HGB BLD-MCNC: 13.4 GM/DL (ref 13–17.5)
LUC NFR BLD AUTO: 2 %
LYMPHOCYTES # SPEC AUTO: 2.1 K/UL (ref 1–4.8)
LYMPHOCYTES NFR SPEC AUTO: 26 %
MCH RBC QN AUTO: 29.4 PG (ref 25–35)
MCHC RBC AUTO-ENTMCNC: 31.5 G/DL (ref 31–37)
MCV RBC AUTO: 93.3 FL (ref 80–100)
MONOCYTES # BLD AUTO: 0.6 K/UL (ref 0–1)
MONOCYTES NFR BLD AUTO: 7 %
NEUTROPHILS # BLD AUTO: 4.8 K/UL (ref 1.3–7.7)
NEUTROPHILS NFR BLD AUTO: 58 %
PH UR: 6 [PH] (ref 5–8)
SP GR UR: 1.01 (ref 1–1.03)
UA BILLING (MACRO VS. MICRO): (no result)
UROBILINOGEN UR QL STRIP: <2 MG/DL (ref ?–2)
WBC # BLD AUTO: 0.2 10*3/UL
WBC # BLD AUTO: 8.1 K/UL (ref 3.8–10.6)
WBC (PEROX): 8.5

## 2017-08-22 RX ADMIN — FAMOTIDINE SCH MG: 10 INJECTION, SOLUTION INTRAVENOUS at 08:16

## 2017-08-22 RX ADMIN — HEPARIN SODIUM SCH UNIT: 5000 INJECTION, SOLUTION INTRAVENOUS; SUBCUTANEOUS at 00:25

## 2017-08-22 RX ADMIN — ASPIRIN 81 MG CHEWABLE TABLET SCH MG: 81 TABLET CHEWABLE at 08:16

## 2017-08-22 RX ADMIN — MORPHINE SULFATE PRN MG: 2 INJECTION, SOLUTION INTRAMUSCULAR; INTRAVENOUS at 08:25

## 2017-08-22 RX ADMIN — MORPHINE SULFATE PRN MG: 2 INJECTION, SOLUTION INTRAMUSCULAR; INTRAVENOUS at 20:20

## 2017-08-22 RX ADMIN — CEFAZOLIN SCH MLS/HR: 330 INJECTION, POWDER, FOR SOLUTION INTRAMUSCULAR; INTRAVENOUS at 03:33

## 2017-08-22 RX ADMIN — HEPARIN SODIUM SCH UNIT: 5000 INJECTION, SOLUTION INTRAVENOUS; SUBCUTANEOUS at 17:13

## 2017-08-22 RX ADMIN — HEPARIN SODIUM SCH UNIT: 5000 INJECTION, SOLUTION INTRAVENOUS; SUBCUTANEOUS at 23:46

## 2017-08-22 RX ADMIN — TAMSULOSIN HYDROCHLORIDE SCH MG: 0.4 CAPSULE ORAL at 20:20

## 2017-08-22 RX ADMIN — Medication SCH MG: at 08:16

## 2017-08-22 RX ADMIN — METOPROLOL TARTRATE SCH MG: 25 TABLET, FILM COATED ORAL at 08:16

## 2017-08-22 RX ADMIN — CEFAZOLIN SCH MLS/HR: 330 INJECTION, POWDER, FOR SOLUTION INTRAMUSCULAR; INTRAVENOUS at 17:12

## 2017-08-22 RX ADMIN — FAMOTIDINE SCH MG: 10 INJECTION, SOLUTION INTRAVENOUS at 20:19

## 2017-08-22 RX ADMIN — HEPARIN SODIUM SCH UNIT: 5000 INJECTION, SOLUTION INTRAVENOUS; SUBCUTANEOUS at 08:15

## 2017-08-22 RX ADMIN — CITALOPRAM HYDROBROMIDE SCH MG: 20 TABLET ORAL at 20:19

## 2017-08-22 RX ADMIN — METOPROLOL TARTRATE SCH MG: 25 TABLET, FILM COATED ORAL at 20:19

## 2017-08-22 RX ADMIN — THERA TABS SCH EACH: TAB at 20:20
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
## 2017-08-22 NOTE — CT
EXAM:

  CT Abdomen and Pelvis Without Intravenous Contrast

 

CLINICAL HISTORY:

  Reason: anal bleeding.  Rectal bleeding, ileostomy, bowel resection, 

cholecystectomy, appendectomy

 

TECHNIQUE:

  Axial computed tomography images of the abdomen and pelvis without 

intravenous contrast.  CTDI is 10.9 mGy and DLP is 541.7 mGy-cm.  This CT 

exam was performed using one or more of the following dose reduction 

techniques: automated exposure control, adjustment of the mA and/or kV 

according to patient size, and/or use of iterative reconstruction 

technique.

 

COMPARISON:

  CT abdomen-pelvis 1/16/2017

 

FINDINGS:

  Lower thorax:  Mild bibasilar subsegmental atelectasis or fibrotic 

scarring.  Mild left pleural thickening and minimal left pleural effusion.

  Prominent coronary arterial calcifications.

 

 ABDOMEN:

  Liver:  Liver is unremarkable.

  Gallbladder and bile ducts: Status post cholecystectomy

  Pancreas:  Pancreas is unremarkable.  

  Spleen:  Calcified splenic granulomas.

  Adrenals:  No adrenal masses

  Kidneys and ureters:  Bilateral perinephric stranding.  No evidence of 

renal calculi or hydronephrosis.

  Stomach and bowel:  Postsurgical changes of subtotal colectomy with 

right mid abdominal ileostomy.  No evidence of bowel obstruction or 

pneumoperitoneum.  There is distal rectosigmoid remnant containing low-

density fluid identified in the pelvis.  Mild perirectal and presacral 

soft tissue stranding, not significantly changed since 1/16/2017 which 

may reflect postsurgical, post therapy or possible inflammatory changes.  

There is also mild perianal soft tissue stranding which can may reflect 

postsurgical or perianal inflammatory changes.  No evidence of perianal 

abscess.  Development of moderate sized left paramedian mid to lower 

abdominal ventral hernia containing multiple loops of nondilated small 

bowel.  This is new since prior CT of 1/16/2017 and is identified along 

incisional scar suggesting incisional hernia.

  Appendix: Status post appendectomy and subtotal colectomy.

 

 PELVIS:

  Bladder:  Mild diffuse bladder wall thickening.  Multifocal densities 

along posterior aspect of bladder wall may reflect urinary sediment, but 

cannot exclude possibility of bladder mass/neoplasm.  No bladder calculi 

identified.

  Reproductive:  Mild prostatic enlargement with prostatic calcifications.

 

 ABDOMEN and PELVIS:

  Intraperitoneal space:  See above.

  Bones/joints:  Development of moderate to severe T10 vertebral 

compression fracture which appears somewhat sclerotic and is new since 

1/16/2017 moderate T12 and L2 vertebral compression fractures which are 

chronic and unchanged. 

  Vasculature:  Mild-to-moderate calcific atherosclerotic disease.  No 

evidence of abdominal aortic aneurysm.

  Lymph nodes:  Unremarkable.  No enlarged lymph nodes.

 

IMPRESSION:     

  Post surgical changes of subtotal colectomy with distal rectosigmoid 

remnant.  Chronic mild perirectal and pre-sacral as well as perianal soft 

tissue stranding which may reflect post surgical, post therapy or 

inflammatory changes.  Clinical correlation recommended.  Colonoscopy of 

distal colonic segment should be considered for further evaluation in 

this patient with history of anal/rectal bleeding.

 

  Development of moderate sized left paramedian mid-lower abdominal 

ventral hernia containing multiple loops of nondilated small bowel.  No 

evidence of bowel obstruction.  Right mid abdominal ileostomy.

 

  Mild bladder wall thickening which may reflect cystitis.  More focal 

soft tissue density along posterior aspect of bladder may be related to 

urinary sediment, but cannot exclude possibility of bladder mass/neoplasm.

 

  Development of moderate to severe T10 vertebral compression fracture 

which appears somewhat sclerotic and is of indeterminate etiology.  

Chronic T12 and L2 vertebral compression fractures.

 

 

 

[employer]

 

08/22/17 00:35 Verify Receipt Verified receipt with LAW Hinojosa. Report 

given to LAW Fournier in 5E on 08/22 00:34 (-04:00)

## 2017-08-23 PROCEDURE — 0DBP8ZX EXCISION OF RECTUM, VIA NATURAL OR ARTIFICIAL OPENING ENDOSCOPIC, DIAGNOSTIC: ICD-10-PCS

## 2017-08-23 RX ADMIN — HEPARIN SODIUM SCH UNIT: 5000 INJECTION, SOLUTION INTRAVENOUS; SUBCUTANEOUS at 17:24

## 2017-08-23 RX ADMIN — MORPHINE SULFATE PRN MG: 2 INJECTION, SOLUTION INTRAMUSCULAR; INTRAVENOUS at 06:40

## 2017-08-23 RX ADMIN — OXYCODONE HYDROCHLORIDE AND ACETAMINOPHEN PRN EACH: 10; 325 TABLET ORAL at 20:49

## 2017-08-23 RX ADMIN — TAMSULOSIN HYDROCHLORIDE SCH MG: 0.4 CAPSULE ORAL at 20:49

## 2017-08-23 RX ADMIN — FAMOTIDINE SCH MG: 10 INJECTION, SOLUTION INTRAVENOUS at 09:34

## 2017-08-23 RX ADMIN — CEFAZOLIN SCH MLS/HR: 330 INJECTION, POWDER, FOR SOLUTION INTRAMUSCULAR; INTRAVENOUS at 05:49

## 2017-08-23 RX ADMIN — CEFAZOLIN SCH MLS/HR: 330 INJECTION, POWDER, FOR SOLUTION INTRAMUSCULAR; INTRAVENOUS at 20:53

## 2017-08-23 RX ADMIN — ASPIRIN 81 MG CHEWABLE TABLET SCH: 81 TABLET CHEWABLE at 12:15

## 2017-08-23 RX ADMIN — CITALOPRAM HYDROBROMIDE SCH MG: 20 TABLET ORAL at 20:49

## 2017-08-23 RX ADMIN — METOPROLOL TARTRATE SCH MG: 25 TABLET, FILM COATED ORAL at 20:49

## 2017-08-23 RX ADMIN — FAMOTIDINE SCH MG: 10 INJECTION, SOLUTION INTRAVENOUS at 20:49

## 2017-08-23 RX ADMIN — HEPARIN SODIUM SCH: 5000 INJECTION, SOLUTION INTRAVENOUS; SUBCUTANEOUS at 09:33

## 2017-08-23 RX ADMIN — Medication SCH: at 09:40

## 2017-08-23 RX ADMIN — MORPHINE SULFATE PRN MG: 2 INJECTION, SOLUTION INTRAMUSCULAR; INTRAVENOUS at 19:29

## 2017-08-23 RX ADMIN — METOPROLOL TARTRATE SCH MG: 25 TABLET, FILM COATED ORAL at 09:34

## 2017-08-23 RX ADMIN — THERA TABS SCH EACH: TAB at 20:49

## 2017-08-23 NOTE — P.PN
Progress Note - Text


The patient was seen by Dr. Oseguera yesterday for evaluation of some nonspecific 

abnormalities noted in the bladder and region of the prostate seen on a 

noncontrast computed tomography scan of the abdomen/pelvis.  I reviewed the 

computed tomography scan and compared it with a computed tomography scan of the 

abdomen and pelvis performed with IV contrast which had been done in 1/2017 and 

there did not appear to be any significant changes.  The patient does have a 

history of urinary retention secondary to underlying BPH and it is possible 

that the abnormalities noted within the bladder are related to this.  I 

discussed outpatient cystoscopy in my office following discharge and this 

should be set up once the patient is ready to go home.

## 2017-08-23 NOTE — P.PCN
Date of Procedure: 08/23/17


Preoperative Diagnosis: 





Postoperative Diagnosis: 





Procedure(s) Performed: 


PREOPERATIVE DIAGNOSIS: Rectal bleed


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


PROCEDURE: Flexible proctoscopy with biopsy


ANESTHESIA: MAC


SURGEON: Td Massey M.D.


SPECIMENS: Proctitis


ENDOSCOPIC PROCEDURE:  The patient was placed on the endoscopy table in the 

left decubitus position.  The Olympus flexible sigmoidoscope was inserted into 

the anus and passed under direct visualization to the proximal staple line.  

The entire rectum was inflamed.  Random biopsies were taken.  I could not 

visualize any fistulas internally however the patient had evidence of perianal 

scarring and some small wounds consistent with chronic fistula formation.  The 

patient was taken to the recovery room in stable condition per anesthesia 

guidelines.


RECOMMENDATIONS: Discussed case with GI.  We'll consult them to consider immune 

therapy.


Implants: 





Indications for Procedure: 





Operative Findings: 





Description of Procedure:
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
## 2017-08-23 NOTE — P.PN
Subjective


Principal diagnosis: 


72-year-old male patient seen and examined today.  Patient states he is feeling 

fairly well.  Patient underwent flexible proctoscopy with Dr. Massey today.  

Patient states he continues to have episodes of rectal bleeding.  Patient 

states he has no bleeding in his colostomy.  He states his pain is well-

controlled and currently not having any abdominal pain.  He has a good appetite 

but does not like the food here.  States his wife is bringing him dinner to the 

hospital tonight.  He has been tolerating his diet well without any nausea or 

vomiting.  His laboratory studies from 08/22/2017 were reviewed and appear 

stable.  Urinalysis from 08/22/2017 is unremarkable.  Vital signs have remained 

stable and patient is afebrile at this time.








Objective





- Vital Signs


Vital signs: 


 Vital Signs











Temp  98.4 F   08/23/17 07:00


 


Pulse  80   08/23/17 07:00


 


Resp  16   08/23/17 07:00


 


BP  112/66   08/23/17 07:00


 


Pulse Ox  97   08/23/17 07:00








 Intake & Output











 08/22/17 08/23/17 08/23/17





 18:59 06:59 18:59


 


Intake Total  1180 700


 


Balance  1180 700


 


Intake:   


 


  IV   100


 


  Intake, IV Titration   600





  Amount   


 


    Sodium Chloride 0.9% 1,   600





    000 ml @ 75 mls/hr IV .   





    [id number] Rx#:[id number]   


 


  Oral  1180 


 


Other:   


 


  # Voids 2 2 














- Exam


GENERAL: Alert and oriented.  Appears in no acute distress.  Pleasant.


RESPIRATORY: Lungs clear bilaterally.  No use of accessory muscles.  Patient 

maintaining oxygen saturation greater than 92%.


CARDIOVASCULAR: S1 and S2 noted.  No murmurs auscultated.  No JVD noted.


EXTREMITIES: No edema noted.  Palpable pedal pulses +2.


ABDOMEN: Ileostomy present.  No distention noted.  Abdomen soft and round.  

Normal active bowel sounds auscultated 4 quadrants.  Slight tenderness noted 

upon palpation.








- Labs


CBC & Chem 7: 


 08/22/17 00:18





 08/21/17 14:28





Assessment and Plan


Plan: 


ASSESSMENT:





1. Rectal bleeding, acute, present on admission. S/P Flexible protoscopy which 

revealed inflammation of the rectum and suspected perianal fistula


2. History of ileostomy due to ulcerative colitis


3. BPH, chronic


4.  Urinary retention, chronic, related to BPH. 


5.  History of COPD, chronic, no evidence of acute exacerbation








PLAN:





Await further recommendations from Dr. Massey


DVT prophylaxis.  Patient receiving heparin subcu


GI prophylaxis.  Patient receiving Pepcid 20 mg IV every 12 hours


Continue to monitor vital signs and address as appropriate


Anticipate discharge within the next 24-48 hours


Continue IV hydration.  Anticipate discontinuing IV fluids tomorrow.


Continue Flomax for BPH


Patient to see urology outpatient for cystoscopy








The above impression and plan of care have been discussed and directed by 

signing physician. Micaela Enamorado, nurse practitioner, acting as scribe for 

signing physician.
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
## 2017-08-24 LAB
ALP SERPL-CCNC: 136 U/L (ref 38–126)
ALT SERPL-CCNC: 22 U/L (ref 21–72)
ANION GAP SERPL CALC-SCNC: 5 MMOL/L
AST SERPL-CCNC: 17 U/L (ref 17–59)
BASOPHILS # BLD AUTO: 0 K/UL (ref 0–0.2)
BASOPHILS NFR BLD AUTO: 0 %
BUN SERPL-SCNC: 8 MG/DL (ref 9–20)
CALCIUM SPEC-MCNC: 8.7 MG/DL (ref 8.4–10.2)
CH: 30.3
CHCM: 32.7
CHLORIDE SERPL-SCNC: 110 MMOL/L (ref 98–107)
CO2 SERPL-SCNC: 25 MMOL/L (ref 22–30)
EOSINOPHIL # BLD AUTO: 0.3 K/UL (ref 0–0.7)
EOSINOPHIL NFR BLD AUTO: 6 %
ERYTHROCYTE [DISTWIDTH] IN BLOOD BY AUTOMATED COUNT: 4.25 M/UL (ref 4.3–5.9)
ERYTHROCYTE [DISTWIDTH] IN BLOOD: 14.8 % (ref 11.5–15.5)
GLUCOSE SERPL-MCNC: 82 MG/DL (ref 74–99)
HCT VFR BLD AUTO: 39.5 % (ref 39–53)
HDW: 2.59
HGB BLD-MCNC: 12.5 GM/DL (ref 13–17.5)
LUC NFR BLD AUTO: 3 %
LYMPHOCYTES # SPEC AUTO: 1.6 K/UL (ref 1–4.8)
LYMPHOCYTES NFR SPEC AUTO: 26 %
MCH RBC QN AUTO: 29.4 PG (ref 25–35)
MCHC RBC AUTO-ENTMCNC: 31.7 G/DL (ref 31–37)
MCV RBC AUTO: 93 FL (ref 80–100)
MONOCYTES # BLD AUTO: 0.5 K/UL (ref 0–1)
MONOCYTES NFR BLD AUTO: 9 %
NEUTROPHILS # BLD AUTO: 3.3 K/UL (ref 1.3–7.7)
NEUTROPHILS NFR BLD AUTO: 56 %
NON-AFRICAN AMERICAN GFR(MDRD): >60
POTASSIUM SERPL-SCNC: 4.3 MMOL/L (ref 3.5–5.1)
PROT SERPL-MCNC: 5.7 G/DL (ref 6.3–8.2)
SODIUM SERPL-SCNC: 140 MMOL/L (ref 137–145)
WBC # BLD AUTO: 0.2 10*3/UL
WBC # BLD AUTO: 6 K/UL (ref 3.8–10.6)
WBC (PEROX): 6.13

## 2017-08-24 RX ADMIN — FAMOTIDINE SCH MG: 10 INJECTION, SOLUTION INTRAVENOUS at 07:25

## 2017-08-24 RX ADMIN — CITALOPRAM HYDROBROMIDE SCH MG: 20 TABLET ORAL at 20:18

## 2017-08-24 RX ADMIN — HEPARIN SODIUM SCH UNIT: 5000 INJECTION, SOLUTION INTRAVENOUS; SUBCUTANEOUS at 07:23

## 2017-08-24 RX ADMIN — METOPROLOL TARTRATE SCH MG: 25 TABLET, FILM COATED ORAL at 20:18

## 2017-08-24 RX ADMIN — Medication SCH MG: at 07:24

## 2017-08-24 RX ADMIN — ASPIRIN 81 MG CHEWABLE TABLET SCH MG: 81 TABLET CHEWABLE at 07:24

## 2017-08-24 RX ADMIN — HEPARIN SODIUM SCH: 5000 INJECTION, SOLUTION INTRAVENOUS; SUBCUTANEOUS at 15:49

## 2017-08-24 RX ADMIN — HEPARIN SODIUM SCH: 5000 INJECTION, SOLUTION INTRAVENOUS; SUBCUTANEOUS at 00:45

## 2017-08-24 RX ADMIN — THERA TABS SCH EACH: TAB at 20:18

## 2017-08-24 RX ADMIN — TAMSULOSIN HYDROCHLORIDE SCH MG: 0.4 CAPSULE ORAL at 20:19

## 2017-08-24 RX ADMIN — FAMOTIDINE SCH MG: 10 INJECTION, SOLUTION INTRAVENOUS at 20:19

## 2017-08-24 RX ADMIN — HEPARIN SODIUM SCH UNIT: 5000 INJECTION, SOLUTION INTRAVENOUS; SUBCUTANEOUS at 23:06

## 2017-08-24 RX ADMIN — METOPROLOL TARTRATE SCH MG: 25 TABLET, FILM COATED ORAL at 07:25

## 2017-08-24 RX ADMIN — CEFAZOLIN SCH MLS/HR: 330 INJECTION, POWDER, FOR SOLUTION INTRAMUSCULAR; INTRAVENOUS at 23:06

## 2017-08-24 RX ADMIN — MORPHINE SULFATE PRN MG: 2 INJECTION, SOLUTION INTRAMUSCULAR; INTRAVENOUS at 20:19

## 2017-08-24 RX ADMIN — OXYCODONE HYDROCHLORIDE AND ACETAMINOPHEN PRN EACH: 10; 325 TABLET ORAL at 09:06

## 2017-08-24 RX ADMIN — CEFAZOLIN SCH MLS/HR: 330 INJECTION, POWDER, FOR SOLUTION INTRAMUSCULAR; INTRAVENOUS at 07:27
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
## 2017-08-24 NOTE — P.PN
Subjective


Principal diagnosis: 


72-year-old male patient seen and examined today.  Patient states he is feeling 

well.  Patient underwent flexible proctoscopy with Dr. Massey yesterday.  

Patient states he continues to have episodes of rectal bleeding.  Patient 

states he has no bleeding in his ileostomy.  He states his pain is well-

controlled and currently not having any abdominal pain. He states GI group saw 

him today and recommends following up outpatient. He has a good appetite but 

does not like the food here.  States his wife has been bringing him food to the 

hospital.  He has been tolerating his diet well without any nausea or vomiting.

  His laboratory studies from 08/24/2017 were reviewed and appear stable.  

Urinalysis from 08/22/2017 is unremarkable.  Vital signs have remained stable 

and patient is afebrile at this time.





Hospital course:


72-year-old  male who was admitted to the hospital on 08/21/2017 with 

a chief complaint of rectal bleeding.  Patient has a history of ulcerative 

colitis and has an ileostomy in place.  The patient had a computed tomography 

scan of his abdomen which revealed a possible bladder mass and urology was 

consulted.  His urinalysis was unremarkable.  He has a history of BPH and is 

prescribed Flomax.  Urology recommends that patient follow up outpatient for 

cystoscopy.  Dr. Massey was consulted due to the patient's rectal bleeding.  The 

patient underwent a flexible proctoscopy with Dr. Massey on 08/23/2017, which 

revealed proctitis and a suspected perianal fistula.  GI was then consulted to 

see the patient.  GI saw the patient this morning and recommends the patient 

follow up outpatient in their office in 7-10 days to discuss the possibility of 

biologic therapy to help resolve that she'll formation and for Crohn's 

management.  Also recommend steroid-based enemas daily for 2 weeks and then 

every other day for the third week for treatment of proctitis.  The patient was 

given a prescription by the GI nurse practitioner.  The patient's vital signs 

remained stable throughout admission with a last blood pressure reading of 108/

66.  He has remained afebrile with a temperature of 96.8 at the last reading 

and his oxygen saturation is 96% on room air.  The patient has denied any chest 

pain or shortness of breath.  He is medically stable.








Objective





- Vital Signs


Vital signs: 


 Vital Signs











Temp  96.8 F L  08/24/17 07:00


 


Pulse  64   08/24/17 08:00


 


Resp  16   08/24/17 08:00


 


BP  108/66   08/24/17 07:00


 


Pulse Ox  96   08/24/17 07:00








 Intake & Output











 08/23/17 08/24/17 08/24/17





 18:59 06:59 18:59


 


Intake Total 700 300 


 


Balance 700 300 


 


Intake:   


 


   300 


 


    Sodium Chloride 0.9% 1,  300 





    000 ml @ 75 mls/hr IV .   





    [id number] MAYA Rx#:[id number]   


 


  Intake, IV Titration 600  





  Amount   


 


    Sodium Chloride 0.9% 1, 600  





    000 ml @ 75 mls/hr IV .   





    [id number] MAYA Rx#:[id number]   


 


Other:   


 


  # Voids   2














- Exam


GENERAL: Alert and oriented.  Appears in no acute distress.  Pleasant.


RESPIRATORY: Lungs clear bilaterally.  No use of accessory muscles.  Patient 

maintaining oxygen saturation greater than 92%.


CARDIOVASCULAR: S1 and S2 noted.  No murmurs auscultated.  No JVD noted.


EXTREMITIES: No edema noted.  Palpable pedal pulses +2.


ABDOMEN: Ileostomy present.  No distention noted.  Abdomen soft and round.  

Normal active bowel sounds auscultated 4 quadrants.  Slight tenderness noted 

upon palpation.








- Labs


CBC & Chem 7: 


 08/24/17 07:01





 08/24/17 07:01


Labs: 


 Abnormal Lab Results - Last 24 Hours (Table)











  08/24/17 08/24/17 Range/Units





  07:01 07:01 


 


RBC  4.25 L   (4.30-5.90)  m/uL


 


Hgb  12.5 L   (13.0-17.5)  gm/dL


 


Chloride   110 H  ()  mmol/L


 


BUN   8 L  (9-20)  mg/dL


 


Alkaline Phosphatase   136 H  ()  U/L


 


Total Protein   5.7 L  (6.3-8.2)  g/dL


 


Albumin   2.9 L  (3.5-5.0)  g/dL














Assessment and Plan


Plan: 


ASSESSMENT:





1. Rectal bleeding, acute, present on admission. S/P Flexible protoscopy which 

revealed inflammation of the rectum and suspected perianal fistula


2. History of ileostomy due to ulcerative colitis


3. BPH, chronic


4. Urinary retention, chronic, related to BPH. 


5. History of COPD, chronic, no evidence of acute exacerbation


6. Proctitis, acute, secondary to Crohns/ Ulcerative Colitis


7. Perirectal fistula, acute, secondary to Crohns/Ulcerative Colitis








PLAN:





-Patient to follow-up with GI outpatient.  GI recommending possible biologic 

therapy to help resolve fistula and steroid enemas daily for 2 weeks then every 

other day for the third week for treatment of proctitis.


-Await further recommendations from Dr. Massey


-DVT prophylaxis.  Patient receiving heparin subcu


-GI prophylaxis.  Patient receiving Pepcid 20 mg IV every 12 hours


-Continue to monitor vital signs and address as appropriate


-Discontinue IV fluids


-Continue Flomax for BPH


-Patient to see urology outpatient for cystoscopy


-Anticipate discharge tomorrow per Dr Nicole.








The above impression and plan of care have been discussed and directed by 

signing physician. Micaela Enamorado, nurse practitioner, acting as scribe for 

signing physician.

## 2017-08-24 NOTE — P.PN
Subjective


Principal diagnosis: 





Rectal bleeding/proctitis





Patient without new complaints.  Denies abdominal pain.  Did have some rectal 

bleeding after proctoscopy yesterday. GI has been consulted.





Objective





- Vital Signs


Vital signs: 


 Vital Signs











Temp  96.8 F L  08/24/17 07:00


 


Pulse  64   08/24/17 15:09


 


Resp  16   08/24/17 15:09


 


BP  108/66   08/24/17 07:00


 


Pulse Ox  96   08/24/17 07:00








 Intake & Output











 08/23/17 08/24/17 08/24/17





 18:59 06:59 18:59


 


Intake Total 700 300 240


 


Balance 700 300 240


 


Intake:   


 


   300 


 


    Sodium Chloride 0.9% 1,  300 





    000 ml @ 75 mls/hr IV .   





    [id number] MAYA Rx#:[id number]   


 


  Intake, IV Titration 600  





  Amount   


 


    Sodium Chloride 0.9% 1, 600  





    000 ml @ 75 mls/hr IV .   





    [id number] MAYA Rx#:[id number]   


 


  Oral   240


 


Other:   


 


  # Voids   2














- Exam





Abdomen: Soft, nontender, nondistended





- Labs


CBC & Chem 7: 


 08/24/17 07:01





 08/24/17 07:01


Labs: 


 Abnormal Lab Results - Last 24 Hours (Table)











  08/24/17 08/24/17 Range/Units





  07:01 07:01 


 


RBC  4.25 L   (4.30-5.90)  m/uL


 


Hgb  12.5 L   (13.0-17.5)  gm/dL


 


Chloride   110 H  ()  mmol/L


 


BUN   8 L  (9-20)  mg/dL


 


Alkaline Phosphatase   136 H  ()  U/L


 


Total Protein   5.7 L  (6.3-8.2)  g/dL


 


Albumin   2.9 L  (3.5-5.0)  g/dL














Assessment and Plan


(1) Colitis


Narrative/Plan: 


Continue steroid enemas as prescribed by GI.  Continue diet as tolerated.  We'

ll sign off at this point.  Please contact if needed.


Status: Acute

## 2017-08-25 VITALS
HEART RATE: 78 BPM | SYSTOLIC BLOOD PRESSURE: 108 MMHG | DIASTOLIC BLOOD PRESSURE: 59 MMHG | TEMPERATURE: 97 F | RESPIRATION RATE: 16 BRPM

## 2017-08-25 LAB
ALP SERPL-CCNC: 132 U/L (ref 38–126)
ALT SERPL-CCNC: 22 U/L (ref 21–72)
ANION GAP SERPL CALC-SCNC: 7 MMOL/L
AST SERPL-CCNC: 17 U/L (ref 17–59)
BASOPHILS # BLD AUTO: 0 K/UL (ref 0–0.2)
BASOPHILS NFR BLD AUTO: 0 %
BUN SERPL-SCNC: 7 MG/DL (ref 9–20)
CALCIUM SPEC-MCNC: 8.7 MG/DL (ref 8.4–10.2)
CH: 30.4
CHCM: 32.8
CHLORIDE SERPL-SCNC: 112 MMOL/L (ref 98–107)
CO2 SERPL-SCNC: 23 MMOL/L (ref 22–30)
EOSINOPHIL # BLD AUTO: 0.4 K/UL (ref 0–0.7)
EOSINOPHIL NFR BLD AUTO: 6 %
ERYTHROCYTE [DISTWIDTH] IN BLOOD BY AUTOMATED COUNT: 4.22 M/UL (ref 4.3–5.9)
ERYTHROCYTE [DISTWIDTH] IN BLOOD: 15 % (ref 11.5–15.5)
GLUCOSE SERPL-MCNC: 81 MG/DL (ref 74–99)
HCT VFR BLD AUTO: 39.4 % (ref 39–53)
HDW: 2.61
HGB BLD-MCNC: 12.4 GM/DL (ref 13–17.5)
LUC NFR BLD AUTO: 4 %
LYMPHOCYTES # SPEC AUTO: 1.6 K/UL (ref 1–4.8)
LYMPHOCYTES NFR SPEC AUTO: 26 %
MCH RBC QN AUTO: 29.4 PG (ref 25–35)
MCHC RBC AUTO-ENTMCNC: 31.6 G/DL (ref 31–37)
MCV RBC AUTO: 93.2 FL (ref 80–100)
MONOCYTES # BLD AUTO: 0.5 K/UL (ref 0–1)
MONOCYTES NFR BLD AUTO: 8 %
NEUTROPHILS # BLD AUTO: 3.5 K/UL (ref 1.3–7.7)
NEUTROPHILS NFR BLD AUTO: 56 %
NON-AFRICAN AMERICAN GFR(MDRD): >60
POTASSIUM SERPL-SCNC: 4 MMOL/L (ref 3.5–5.1)
PROT SERPL-MCNC: 5.5 G/DL (ref 6.3–8.2)
SODIUM SERPL-SCNC: 142 MMOL/L (ref 137–145)
WBC # BLD AUTO: 0.23 10*3/UL
WBC # BLD AUTO: 6.3 K/UL (ref 3.8–10.6)
WBC (PEROX): 6.47

## 2017-08-25 RX ADMIN — Medication SCH MG: at 09:44

## 2017-08-25 RX ADMIN — ASPIRIN 81 MG CHEWABLE TABLET SCH MG: 81 TABLET CHEWABLE at 09:45

## 2017-08-25 RX ADMIN — FAMOTIDINE SCH: 10 INJECTION, SOLUTION INTRAVENOUS at 09:46

## 2017-08-25 RX ADMIN — METOPROLOL TARTRATE SCH MG: 25 TABLET, FILM COATED ORAL at 09:44

## 2017-08-25 RX ADMIN — Medication SCH MG: at 09:45

## 2017-08-25 RX ADMIN — HEPARIN SODIUM SCH: 5000 INJECTION, SOLUTION INTRAVENOUS; SUBCUTANEOUS at 09:46

## 2017-08-25 NOTE — P.DS
Providers


Date of admission: 


08/21/17 12:25





Expected date of discharge: 08/25/17


Attending physician: 


Ricky Nicole





Consults: 





 





08/21/17 22:52


Consult Physician Urgent 


   Consulting Provider: Emanuel Doip


   Consult Reason/Comments: anal bleeding


   Do you want consulting provider notified?: Yes





08/22/17 07:59


Consult Physician Routine 


   Consulting Provider: Rayo Jc


   Consult Reason/Comments: bladder mass


   Do you want consulting provider notified?: Yes





08/23/17 10:53


Consult Physician Routine 


   Consulting Provider: David Kaplan


   Consult Reason/Comments: Proctitis with fistula formation


   Do you want consulting provider notified?: Yes











Primary care physician: 


[facility]





[facility] Hospital Course: 


Hospital course:


72-year-old  male who was admitted to the hospital on 08/21/2017 with 

a chief complaint of rectal bleeding.  Patient has a history of ulcerative 

colitis and has an ileostomy in place.  The patient had a computed tomography 

scan of his abdomen which revealed a possible bladder mass and urology was 

consulted.  His urinalysis was unremarkable.  He has a history of BPH and is 

prescribed Flomax.  Urology recommends that patient follow up outpatient for 

cystoscopy.  Dr. Massey was consulted due to the patient's rectal bleeding.  The 

patient underwent a flexible proctoscopy with Dr. Massey on 08/23/2017, which 

revealed proctitis and a suspected perianal fistula.  GI was then consulted to 

see the patient.  GI saw the patient this morning and recommends the patient 

follow up outpatient in their office in 7-10 days to discuss the possibility of 

biologic therapy to help resolve that she'll formation and for Crohn's 

management.  Also recommend steroid-based enemas daily for 2 weeks and then 

every other day for the third week for treatment of proctitis.  The patient was 

given a prescription by the GI nurse practitioner.  The patient's vital signs 

remained stable throughout admission with a last blood pressure reading of 108/

66.  He has remained afebrile with a temperature of 96.8 at the last reading 

and his oxygen saturation is 96% on room air.  The patient has denied any chest 

pain or shortness of breath.  He is medically stable.





DISCHARGE DIAGNOSIS:





1. Rectal bleeding, acute, present on admission. S/P Flexible protoscopy which 

revealed inflammation of the rectum and suspected perianal fistula


2. History of ileostomy due to ulcerative colitis


3. BPH, chronic


4. Urinary retention, chronic, related to BPH. 


5. History of COPD, chronic, no evidence of acute exacerbation


6. Proctitis, acute, secondary to Crohns/ Ulcerative Colitis


7. Perirectal fistula, acute, secondary to Crohns/Ulcerative Colitis








The above impression and plan of care have been discussed and directed by 

signing physician. Micaela Enamorado, nurse practitioner, acting as scribe for 

signing physician.





Patient Condition at Discharge: Stable





Plan - Discharge Summary


New Discharge Prescriptions: 


Continue


   Aspirin 81 mg PO DAILY  chew


   Thiamine [Vitamin B-1] 100 mg PO DAILY


   Tamsulosin HCl [Flomax] 0.4 mg PO HS


   Citalopram Hydrobromide [CeleXA] 20 mg PO HS


   Metoprolol Tartrate [Lopressor] 12.5 mg PO BID


   oxyCODONE-APAP 10-325MG [Percocet  mg] 1 tab PO Q6HR PRN


     PRN Reason: Pain


   Multivitamins, Thera [Multivitamin (formulary)] 1 tab PO HS


   Calcitonin,[place],Synthetic [Miacalcin] 1 spray EA NOSTRIL DAILY


   Calcium Carbonate/Vitamin D3 [Calcium 600-Vit D3 200 Tablet] 1 tab PO BID


Discharge Medication List





Aspirin 81 mg PO DAILY  chew 12/22/16 [Rx]


Citalopram Hydrobromide [CeleXA] 20 mg PO HS 01/16/17 [History]


Metoprolol Tartrate [Lopressor] 12.5 mg PO BID 01/16/17 [History]


Tamsulosin HCl [Flomax] 0.4 mg PO HS 01/16/17 [History]


Thiamine [Vitamin B-1] 100 mg PO DAILY 01/16/17 [History]


Calcitonin,[place],Synthetic [Miacalcin] 1 spray EA NOSTRIL DAILY 08/21/17 [

History]


Calcium Carbonate/Vitamin D3 [Calcium 600-Vit D3 200 Tablet] 1 tab PO BID 08/21/ 17 [History]


Multivitamins, Thera [Multivitamin (formulary)] 1 tab PO HS 08/21/17 [History]


oxyCODONE-APAP 10-325MG [Percocet  mg] 1 tab PO Q6HR PRN 08/21/17 [History

]








Follow up Appointment(s)/Referral(s): 


Bernardo Oseguera MD [STAFF PHYSICIAN] - 1 Week ( office will call patient 

to set up appt)


Ricky Nicole MD [Primary Care Provider] - 08/28/17 2:30 pm


Janet Fernandez MD [STAFF PHYSICIAN] - 2 Weeks


Patient Instructions/Handouts:  Rectal Bleeding (DC), Proctitis (DC), Rectal 

Fistulotomy (DC), Colostomy Creation (DC)


Discharge Disposition: HOME SELF-CARE

## 2017-10-18 ENCOUNTER — HOSPITAL ENCOUNTER (INPATIENT)
Dept: HOSPITAL 47 - EC | Age: 72
LOS: 2 days | Discharge: HOME HEALTH | DRG: 543 | End: 2017-10-20
Payer: MEDICARE

## 2017-10-18 VITALS — BODY MASS INDEX: 23.5 KG/M2

## 2017-10-18 DIAGNOSIS — Z79.82: ICD-10-CM

## 2017-10-18 DIAGNOSIS — E46: ICD-10-CM

## 2017-10-18 DIAGNOSIS — Z93.2: ICD-10-CM

## 2017-10-18 DIAGNOSIS — Z90.49: ICD-10-CM

## 2017-10-18 DIAGNOSIS — F32.9: ICD-10-CM

## 2017-10-18 DIAGNOSIS — F17.200: ICD-10-CM

## 2017-10-18 DIAGNOSIS — Z85.118: ICD-10-CM

## 2017-10-18 DIAGNOSIS — J44.9: ICD-10-CM

## 2017-10-18 DIAGNOSIS — Z79.899: ICD-10-CM

## 2017-10-18 DIAGNOSIS — N40.0: ICD-10-CM

## 2017-10-18 DIAGNOSIS — M81.0: ICD-10-CM

## 2017-10-18 DIAGNOSIS — K50.90: ICD-10-CM

## 2017-10-18 DIAGNOSIS — M48.54XA: Primary | ICD-10-CM

## 2017-10-18 DIAGNOSIS — Z86.19: ICD-10-CM

## 2017-10-18 DIAGNOSIS — I10: ICD-10-CM

## 2017-10-18 DIAGNOSIS — G89.29: ICD-10-CM

## 2017-10-18 LAB
ALP SERPL-CCNC: 161 U/L (ref 38–126)
ALT SERPL-CCNC: 22 U/L (ref 21–72)
ANION GAP SERPL CALC-SCNC: 8 MMOL/L
AST SERPL-CCNC: 19 U/L (ref 17–59)
BASOPHILS # BLD AUTO: 0 K/UL (ref 0–0.2)
BASOPHILS NFR BLD AUTO: 0 %
BUN SERPL-SCNC: 10 MG/DL (ref 9–20)
CALCIUM SPEC-MCNC: 9.3 MG/DL (ref 8.4–10.2)
CH: 29.7
CHCM: 32
CHLORIDE SERPL-SCNC: 103 MMOL/L (ref 98–107)
CO2 SERPL-SCNC: 28 MMOL/L (ref 22–30)
EOSINOPHIL # BLD AUTO: 0.3 K/UL (ref 0–0.7)
EOSINOPHIL NFR BLD AUTO: 3 %
ERYTHROCYTE [DISTWIDTH] IN BLOOD BY AUTOMATED COUNT: 4.83 M/UL (ref 4.3–5.9)
ERYTHROCYTE [DISTWIDTH] IN BLOOD: 14.6 % (ref 11.5–15.5)
GLUCOSE SERPL-MCNC: 90 MG/DL (ref 74–99)
HCT VFR BLD AUTO: 44.9 % (ref 39–53)
HDW: 2.7
HGB BLD-MCNC: 14 GM/DL (ref 13–17.5)
LUC NFR BLD AUTO: 2 %
LYMPHOCYTES # SPEC AUTO: 1.5 K/UL (ref 1–4.8)
LYMPHOCYTES NFR SPEC AUTO: 16 %
MCH RBC QN AUTO: 29.1 PG (ref 25–35)
MCHC RBC AUTO-ENTMCNC: 31.2 G/DL (ref 31–37)
MCV RBC AUTO: 93 FL (ref 80–100)
MONOCYTES # BLD AUTO: 0.8 K/UL (ref 0–1)
MONOCYTES NFR BLD AUTO: 9 %
NEUTROPHILS # BLD AUTO: 6.9 K/UL (ref 1.3–7.7)
NEUTROPHILS NFR BLD AUTO: 71 %
NON-AFRICAN AMERICAN GFR(MDRD): >60
PARTICLE COUNT: 3749
PH UR: 6.5 [PH] (ref 5–8)
POTASSIUM SERPL-SCNC: 4.2 MMOL/L (ref 3.5–5.1)
PROT SERPL-MCNC: 6.5 G/DL (ref 6.3–8.2)
RBC UR QL: 8 /HPF (ref 0–5)
SODIUM SERPL-SCNC: 139 MMOL/L (ref 137–145)
SP GR UR: 1.01 (ref 1–1.03)
UA BILLING (MACRO VS. MICRO): (no result)
UROBILINOGEN UR QL STRIP: <2 MG/DL (ref ?–2)
WBC # BLD AUTO: 0.18 10*3/UL
WBC # BLD AUTO: 9.8 K/UL (ref 3.8–10.6)
WBC #/AREA URNS HPF: 98 /HPF (ref 0–5)
WBC (PEROX): 9.64

## 2017-10-18 PROCEDURE — 96361 HYDRATE IV INFUSION ADD-ON: CPT

## 2017-10-18 PROCEDURE — 80053 COMPREHEN METABOLIC PANEL: CPT

## 2017-10-18 PROCEDURE — 85027 COMPLETE CBC AUTOMATED: CPT

## 2017-10-18 PROCEDURE — 96374 THER/PROPH/DIAG INJ IV PUSH: CPT

## 2017-10-18 PROCEDURE — 36415 COLL VENOUS BLD VENIPUNCTURE: CPT

## 2017-10-18 PROCEDURE — 99285 EMERGENCY DEPT VISIT HI MDM: CPT

## 2017-10-18 PROCEDURE — 72131 CT LUMBAR SPINE W/O DYE: CPT

## 2017-10-18 PROCEDURE — 96375 TX/PRO/DX INJ NEW DRUG ADDON: CPT

## 2017-10-18 PROCEDURE — 85025 COMPLETE CBC W/AUTO DIFF WBC: CPT

## 2017-10-18 PROCEDURE — 81001 URINALYSIS AUTO W/SCOPE: CPT

## 2017-10-18 PROCEDURE — 72128 CT CHEST SPINE W/O DYE: CPT

## 2017-10-18 PROCEDURE — 80048 BASIC METABOLIC PNL TOTAL CA: CPT

## 2017-10-18 RX ADMIN — FAMOTIDINE SCH MG: 20 TABLET, FILM COATED ORAL at 20:11

## 2017-10-18 RX ADMIN — METOPROLOL TARTRATE SCH MG: 25 TABLET, FILM COATED ORAL at 20:11

## 2017-10-18 RX ADMIN — CEFAZOLIN SCH: 330 INJECTION, POWDER, FOR SOLUTION INTRAMUSCULAR; INTRAVENOUS at 17:46

## 2017-10-18 RX ADMIN — THERA TABS SCH EACH: TAB at 20:11

## 2017-10-18 RX ADMIN — CITALOPRAM HYDROBROMIDE SCH MG: 20 TABLET ORAL at 20:11

## 2017-10-18 RX ADMIN — METHYLPREDNISOLONE SODIUM SUCCINATE SCH: 125 INJECTION, POWDER, FOR SOLUTION INTRAMUSCULAR; INTRAVENOUS at 17:35

## 2017-10-18 RX ADMIN — TAMSULOSIN HYDROCHLORIDE SCH MG: 0.4 CAPSULE ORAL at 20:11
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
## 2017-10-18 NOTE — CT
EXAMINATION TYPE: CT thor lumbar spine wo con

 

DATE OF EXAM: 10/18/2017

 

COMPARISON: CT abdomen pelvis 8/21/2017, CT chest 11/26/2016

 

HISTORY: back pain, no injury

 

CT DLP: 563.5 mGycm

Automated exposure control for dose reduction was used.

 

TECHNIQUE: Axial images 5 mm thick sections. Reconstructed images in the sagittal and coronal plane.

 

FINDINGS: 

Multiple compression deformities are present within the thoracic spine. This includes a very subtle i
nferior endplate T2 compression deformity is less than 20% loss of vertebral body height anteriorly. 
Compression deformities of T8 and T9 are present with approximately 40 and 50% loss of vertebral body
 height respectively. The T10 vertebral level is preserved with compression deformities noted along t
he superior endplates of T11 and T12 with approximately 30 and 20% loss of vertebral body height resp
ectively.

 

There is anterior thecal sac flattening from minimal inferior endplate protrusion into the spinal can
al at the T8 level. No spinal canal stenosis is evident. Posterior superior endplate protrusion into 
the spinal canal is present at T11 and T12 with minimal anterior thecal sac compression. No AP spinal
 canal stenosis is present.

 

Mild endplate change may be present at L1 and L3. There is a Schmorl's node within the mid superior e
ndplate of L3.

 

IMPRESSION: 

1. COMPRESSION DEFORMITIES THROUGH THE THORACIC AND LUMBAR SPINE DISCUSSED ABOVE.

2. THERE IS SOME MINIMAL POSTERIOR WALL DISPLACEMENT CENTRALLY WITH MILD ANTERIOR THECAL SAC FLATTENI
NG WITHOUT STENOSIS PRESENT INFERIOR ENDPLATE T8, SUPERIOR ENDPLATE OF T11 AND T12.

3.  COMPRESSION DEFORMITY AT T12 IS AN INTERVAL FROM AUGUST 2017. T11 IS OLD. COMPRESSION DEFORMITIES
 WITHIN THE T8 T10 9 LEVELS ARE INTERVAL FROM 2016.

## 2017-10-19 LAB
ANION GAP SERPL CALC-SCNC: 9 MMOL/L
BUN SERPL-SCNC: 14 MG/DL (ref 9–20)
CALCIUM SPEC-MCNC: 9.2 MG/DL (ref 8.4–10.2)
CH: 30.2
CHCM: 32.8
CHLORIDE SERPL-SCNC: 106 MMOL/L (ref 98–107)
CO2 SERPL-SCNC: 24 MMOL/L (ref 22–30)
ERYTHROCYTE [DISTWIDTH] IN BLOOD BY AUTOMATED COUNT: 4.58 M/UL (ref 4.3–5.9)
ERYTHROCYTE [DISTWIDTH] IN BLOOD: 15.4 % (ref 11.5–15.5)
GLUCOSE SERPL-MCNC: 126 MG/DL (ref 74–99)
HCT VFR BLD AUTO: 42.4 % (ref 39–53)
HDW: 2.65
HGB BLD-MCNC: 13.3 GM/DL (ref 13–17.5)
MCH RBC QN AUTO: 29 PG (ref 25–35)
MCHC RBC AUTO-ENTMCNC: 31.3 G/DL (ref 31–37)
MCV RBC AUTO: 92.7 FL (ref 80–100)
NON-AFRICAN AMERICAN GFR(MDRD): >60
POTASSIUM SERPL-SCNC: 4.8 MMOL/L (ref 3.5–5.1)
SODIUM SERPL-SCNC: 139 MMOL/L (ref 137–145)
WBC # BLD AUTO: 9.3 K/UL (ref 3.8–10.6)

## 2017-10-19 RX ADMIN — METHYLPREDNISOLONE SODIUM SUCCINATE SCH MG: 125 INJECTION, POWDER, FOR SOLUTION INTRAMUSCULAR; INTRAVENOUS at 00:39

## 2017-10-19 RX ADMIN — FAMOTIDINE SCH MG: 20 TABLET, FILM COATED ORAL at 08:11

## 2017-10-19 RX ADMIN — CALCITONIN SALMON SCH SPRAY: 200 SPRAY, METERED NASAL at 08:12

## 2017-10-19 RX ADMIN — HYDROMORPHONE HYDROCHLORIDE PRN MG: 1 INJECTION, SOLUTION INTRAMUSCULAR; INTRAVENOUS; SUBCUTANEOUS at 16:46

## 2017-10-19 RX ADMIN — HYDROMORPHONE HYDROCHLORIDE PRN MG: 1 INJECTION, SOLUTION INTRAMUSCULAR; INTRAVENOUS; SUBCUTANEOUS at 08:19

## 2017-10-19 RX ADMIN — CITALOPRAM HYDROBROMIDE SCH MG: 20 TABLET ORAL at 19:59

## 2017-10-19 RX ADMIN — TAMSULOSIN HYDROCHLORIDE SCH MG: 0.4 CAPSULE ORAL at 20:00

## 2017-10-19 RX ADMIN — THERA TABS SCH EACH: TAB at 20:00

## 2017-10-19 RX ADMIN — CEFAZOLIN SCH MLS/HR: 330 INJECTION, POWDER, FOR SOLUTION INTRAMUSCULAR; INTRAVENOUS at 17:30

## 2017-10-19 RX ADMIN — HYDROMORPHONE HYDROCHLORIDE PRN MG: 1 INJECTION, SOLUTION INTRAMUSCULAR; INTRAVENOUS; SUBCUTANEOUS at 23:11

## 2017-10-19 RX ADMIN — HYDROMORPHONE HYDROCHLORIDE PRN MG: 1 INJECTION, SOLUTION INTRAMUSCULAR; INTRAVENOUS; SUBCUTANEOUS at 19:57

## 2017-10-19 RX ADMIN — HYDROMORPHONE HYDROCHLORIDE PRN MG: 1 INJECTION, SOLUTION INTRAMUSCULAR; INTRAVENOUS; SUBCUTANEOUS at 12:03

## 2017-10-19 RX ADMIN — IPRATROPIUM BROMIDE AND ALBUTEROL SULFATE SCH: .5; 3 SOLUTION RESPIRATORY (INHALATION) at 19:14

## 2017-10-19 RX ADMIN — METOPROLOL TARTRATE SCH MG: 25 TABLET, FILM COATED ORAL at 08:11

## 2017-10-19 RX ADMIN — CEFAZOLIN SCH MLS/HR: 330 INJECTION, POWDER, FOR SOLUTION INTRAMUSCULAR; INTRAVENOUS at 06:04

## 2017-10-19 RX ADMIN — IPRATROPIUM BROMIDE AND ALBUTEROL SULFATE SCH: .5; 3 SOLUTION RESPIRATORY (INHALATION) at 11:48

## 2017-10-19 RX ADMIN — METHYLPREDNISOLONE SODIUM SUCCINATE SCH MG: 125 INJECTION, POWDER, FOR SOLUTION INTRAMUSCULAR; INTRAVENOUS at 23:12

## 2017-10-19 RX ADMIN — IPRATROPIUM BROMIDE AND ALBUTEROL SULFATE SCH: .5; 3 SOLUTION RESPIRATORY (INHALATION) at 11:23

## 2017-10-19 RX ADMIN — ASPIRIN 81 MG CHEWABLE TABLET SCH MG: 81 TABLET CHEWABLE at 08:11

## 2017-10-19 RX ADMIN — METOPROLOL TARTRATE SCH MG: 25 TABLET, FILM COATED ORAL at 19:59

## 2017-10-19 RX ADMIN — IPRATROPIUM BROMIDE AND ALBUTEROL SULFATE SCH: .5; 3 SOLUTION RESPIRATORY (INHALATION) at 15:33

## 2017-10-19 RX ADMIN — HYDROMORPHONE HYDROCHLORIDE PRN MG: 1 INJECTION, SOLUTION INTRAMUSCULAR; INTRAVENOUS; SUBCUTANEOUS at 04:40

## 2017-10-19 RX ADMIN — Medication SCH EACH: at 08:11

## 2017-10-19 RX ADMIN — METHYLPREDNISOLONE SODIUM SUCCINATE SCH MG: 125 INJECTION, POWDER, FOR SOLUTION INTRAMUSCULAR; INTRAVENOUS at 16:20

## 2017-10-19 RX ADMIN — FAMOTIDINE SCH MG: 20 TABLET, FILM COATED ORAL at 19:59

## 2017-10-19 RX ADMIN — METHYLPREDNISOLONE SODIUM SUCCINATE SCH MG: 125 INJECTION, POWDER, FOR SOLUTION INTRAMUSCULAR; INTRAVENOUS at 08:11
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
## 2017-10-19 NOTE — HP
HISTORY AND PHYSICAL



CHIEF COMPLAINT:

A 72-year-old, white male, with severe intractable back pain.



HISTORY OF PRESENT ILLNESS:

72-year-old, white male, admitted to the hospital due to vertebral fractures times six

with significant a irretractable back pain.  He has had back fractures in the past.  He

is unable to get out of his bed this morning due to significant pain throughout his

back despite Percocet.  He is admitted for possible vertebroplasty and neuro

evaluation, neurosurgical evaluation. He has history ileostomy for Crohn disease.

History of lung cancer in remission, generalized poor protoplasm, BPH, hypertension and

depression.



HOME MEDICATIONS:

1. Flomax.

2. Lopressor.

3. Celexa.

4. Vitamin D.

5. Multivitamins.

6. Percocet.

7. Calcitonin.



ALLERGIES:

No known drug allergies.



REVIEW OF SYSTEMS:

14-point review of systems negative except for mentioned in HPI.



PAST MEDICAL HISTORY:

Cancer, COPD, pneumonia, prostate disorder, syncope, ulcerative colitis, GI blood loss

anemia, bacterial colitis, rectal abscess, sepsis, rectal fistulas, diverting

colostomy, reversed ileostomy, _____ times two, syncope due to upper GI bleed, ulcer,

BPH, osteoporosis.



SURGERIES:

Adenoidectomy, appendectomy, cholecystectomy, hernia repair, and tonsillectomy.



FAMILY HISTORY:

Father with ulcerative colitis and mother with dementia.



PHYSICAL EXAM:

Is well-nourished well-developed white male, in no acute distress.  O2 97% on room air.

Blood pressure 150s over 70s, temp 98.2, pulse 72, respiratory rate 18 to 20.

Ophthalmological:  Pupils equal, round, reactive to light and accommodation.

NEUROLOGIC:  Alert and oriented x3.  Psych fair mood and affect.  Neurovascular intact.

Vascular normal dorsalis pedis, +2 radial pulse.  Lungs nontender to palpation.  Lumbar

lower thoracic spinal muscles.  Psych fair mood and effect.



ASSESSMENT:

1. Irretractable back pain secondary to multiple compression fractures of thoracic

    vertebra and delayed healing.

2. History of ileostomy.

3. Ulcerative colitis.

4. Lung cancer.

5. Generalized debility.

6. Protein calorie malnutrition.



Neurosurgeon evaluation is pending.  Please see further orders.  IV pain control.





MMODL / IJN: [id number] / Job#: [id number]

## 2017-10-20 VITALS — HEART RATE: 74 BPM | TEMPERATURE: 96.2 F | SYSTOLIC BLOOD PRESSURE: 117 MMHG | DIASTOLIC BLOOD PRESSURE: 60 MMHG

## 2017-10-20 VITALS — RESPIRATION RATE: 18 BRPM

## 2017-10-20 RX ADMIN — CALCITONIN SALMON SCH SPRAY: 200 SPRAY, METERED NASAL at 08:06

## 2017-10-20 RX ADMIN — FAMOTIDINE SCH MG: 20 TABLET, FILM COATED ORAL at 08:06

## 2017-10-20 RX ADMIN — IPRATROPIUM BROMIDE AND ALBUTEROL SULFATE SCH: .5; 3 SOLUTION RESPIRATORY (INHALATION) at 11:54

## 2017-10-20 RX ADMIN — HYDROMORPHONE HYDROCHLORIDE PRN MG: 1 INJECTION, SOLUTION INTRAMUSCULAR; INTRAVENOUS; SUBCUTANEOUS at 03:55

## 2017-10-20 RX ADMIN — HYDROMORPHONE HYDROCHLORIDE PRN MG: 1 INJECTION, SOLUTION INTRAMUSCULAR; INTRAVENOUS; SUBCUTANEOUS at 08:09

## 2017-10-20 RX ADMIN — METHYLPREDNISOLONE SODIUM SUCCINATE SCH MG: 125 INJECTION, POWDER, FOR SOLUTION INTRAMUSCULAR; INTRAVENOUS at 08:06

## 2017-10-20 RX ADMIN — CEFAZOLIN SCH MLS/HR: 330 INJECTION, POWDER, FOR SOLUTION INTRAMUSCULAR; INTRAVENOUS at 06:24

## 2017-10-20 RX ADMIN — ASPIRIN 81 MG CHEWABLE TABLET SCH MG: 81 TABLET CHEWABLE at 08:06

## 2017-10-20 RX ADMIN — IPRATROPIUM BROMIDE AND ALBUTEROL SULFATE SCH: .5; 3 SOLUTION RESPIRATORY (INHALATION) at 08:43

## 2017-10-20 RX ADMIN — Medication SCH EACH: at 08:06

## 2017-10-20 RX ADMIN — METOPROLOL TARTRATE SCH MG: 25 TABLET, FILM COATED ORAL at 08:06

## 2017-10-20 NOTE — P.DS
Providers


Date of admission: 


10/18/17 16:22





Expected date of discharge: 10/20/17


Attending physician: 


Ricky Nicole





Consults: 





 





10/18/17 16:22


Consult Physician Stat 


   Consulting Provider: JOAN Linn


   Consult Reason/Comments: Intractable thoracic back pain


   Do you want consulting provider notified?: Yes











Primary care physician: 


[facility] Course: 





patient was admitted on 10/18/2017 with intractable back pain.  The patient has 

known chronic compression fractures.  On thoracic/lumbar spine CT the patient 

was found to have a new T12 compression fracture.  Orthopedic surgery was 

consulted and ordered the patient a new back brace.  The patient states that 

his pain is much better with the new back brace.  He feels he is ready to go 

home.  He will follow up with orthopedic surgery and Dr. Nicole next week.


Patient Condition at Discharge: Poor





Plan - Discharge Summary


New Discharge Prescriptions: 


No Action


   Aspirin 81 mg PO DAILY  chew


   Tamsulosin HCl [Flomax] 0.4 mg PO HS


   Citalopram Hydrobromide [CeleXA] 20 mg PO HS


   Metoprolol Tartrate [Lopressor] 12.5 mg PO BID


   oxyCODONE-APAP 10-325MG [Percocet  mg] 1 tab PO Q6HR PRN


     PRN Reason: Pain


   Multivitamins, Thera [Multivitamin (formulary)] 1 tab PO HS


   Calcium Carbonate/Vitamin D3 [Calcium 600-Vit D3 200 Tablet] 1 tab PO DAILY


   Umeclidinium Brm/Vilanterol Tr [Anoro Ellipta 62.5-25 Mcg INH] 1 puff 

INHALATION RT-DAILY


   Calcitonin Nasal [Fortical (Miacalcin)] 1 spray NASAL DAILY


Discharge Medication List





Aspirin 81 mg PO DAILY  chew 12/22/16 [Rx]


Citalopram Hydrobromide [CeleXA] 20 mg PO HS 01/16/17 [History]


Metoprolol Tartrate [Lopressor] 12.5 mg PO BID 01/16/17 [History]


Tamsulosin HCl [Flomax] 0.4 mg PO HS 01/16/17 [History]


Calcium Carbonate/Vitamin D3 [Calcium 600-Vit D3 200 Tablet] 1 tab PO DAILY 08/ 21/17 [History]


Multivitamins, Thera [Multivitamin (formulary)] 1 tab PO HS 08/21/17 [History]


oxyCODONE-APAP 10-325MG [Percocet  mg] 1 tab PO Q6HR PRN 08/21/17 [History

]


Calcitonin Nasal [Fortical (Miacalcin)] 1 spray NASAL DAILY 10/18/17 [History]


Umeclidinium Brm/Vilanterol Tr [Anoro Ellipta 62.5-25 Mcg INH] 1 puff 

INHALATION RT-DAILY 10/18/17 [History]








Follow up Appointment(s)/Referral(s): 


Ricky Nicole MD [Primary Care Provider] - 1-2 days


[facility], [[facility] - 


Activity/Diet/Wound Care/Special Instructions: 


Back brace with exertion and PRN


Discharge Disposition: HOME WITH HOME HEALTH SERVICES

## 2018-01-10 ENCOUNTER — HOSPITAL ENCOUNTER (OUTPATIENT)
Dept: HOSPITAL 47 - RADUSMAIN | Age: 73
Discharge: HOME | End: 2018-01-10
Payer: MEDICARE

## 2018-01-10 DIAGNOSIS — Z53.9: Primary | ICD-10-CM

## 2018-08-14 ENCOUNTER — HOSPITAL ENCOUNTER (OUTPATIENT)
Dept: HOSPITAL 47 - RADUSWWP | Age: 73
Discharge: HOME | End: 2018-08-14
Payer: MEDICARE

## 2018-08-14 DIAGNOSIS — I65.23: Primary | ICD-10-CM

## 2018-08-14 PROCEDURE — 93880 EXTRACRANIAL BILAT STUDY: CPT

## 2018-08-14 NOTE — US
EXAMINATION TYPE: US carotid duplex BILAT

 

DATE OF EXAM: 8/14/2018

 

COMPARISON: US 11/22/2016

 

CLINICAL HISTORY: I95.1 hypotension.

 

EXAM MEASUREMENTS: 

 

RIGHT:  Peak Systolic Velocity (PSV) cm/sec

----- Right CCA:  76.4  

----- Right ICA:  88.6     

----- Right ECA:  96.5   

ICA/CCA ratio:  1.2    

 

RIGHT:  End Diastole cm/sec

----- Right CCA:  16.9   

----- Right ICA:  27.0      

----- Right ECA:  9.8     

 

LEFT:  Peak Systolic Velocity (PSV) cm/sec

----- Left CCA:  74.3  

----- Left ICA:  78.7   

----- Left ECA:  88.6  

ICA/CCA ratio:  1.1  

 

LEFT:  End Diastole cm/sec

----- Left CCA:  19.3  

----- Left ICA:  29.2   

----- Left ECA:  11.7 

 

VERTEBRALS (direction of flow):

Right Vertebral: Antegrade

Left Vertebral: Antegrade

 

Rhythm:  Normal

 

 

 

 

 

IMPRESSION: Mild amount of plaque visualized bilaterally. No elevated velocities. No significant sten
osis.    

 

 

Criteria for Assigning % of Stenosis / Diameter reduction

(Estimation based on the indirect measurements of the internal carotid artery velocities (ICA PSV).

1.  Normal (no stenosis)=ICA PSV < 125 cm/s: ratio < 2.0: ICA EDV<40 cm/s.

2. Less than 50% stenosis=ICA PSV < 125 cm/s: ratio < 2.0: ICA EDV<40 cm/s.

3.  50 to 69% stenosis=ICA PSV of 125 to 230 cm/s: ration 2.0 ? 4.0: ICA EDV  cm/s.

4.  Greater than 70% stenosis to near occlusion= ICA PSV > 230 cm/s: ratio > 4.0: ICA EDV > 100 cm/s.
 

5.  Near occlusion= ICA PSV velocities may be low or undetectable: variable ratio and ICA EDV.

6.  Total occlusion=unable to detect flow.

## 2018-10-02 ENCOUNTER — HOSPITAL ENCOUNTER (EMERGENCY)
Dept: HOSPITAL 47 - EC | Age: 73
Discharge: ANOTHER HOSPITAL | End: 2018-10-02
Payer: MEDICARE

## 2018-10-02 VITALS — HEART RATE: 93 BPM | SYSTOLIC BLOOD PRESSURE: 115 MMHG | TEMPERATURE: 98.6 F | DIASTOLIC BLOOD PRESSURE: 65 MMHG

## 2018-10-02 VITALS — RESPIRATION RATE: 18 BRPM

## 2018-10-02 DIAGNOSIS — K51.90: ICD-10-CM
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
## 2018-10-02 DIAGNOSIS — Z79.899: ICD-10-CM

## 2018-10-02 DIAGNOSIS — N40.0: ICD-10-CM

## 2018-10-02 DIAGNOSIS — S06.370A: Primary | ICD-10-CM

## 2018-10-02 DIAGNOSIS — Z98.890: ICD-10-CM

## 2018-10-02 DIAGNOSIS — M81.0: ICD-10-CM
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
## 2018-10-02 DIAGNOSIS — S22.31XA: ICD-10-CM

## 2018-10-02 DIAGNOSIS — F17.200: ICD-10-CM

## 2018-10-02 LAB
ALBUMIN SERPL-MCNC: 3.1 G/DL (ref 3.5–5)
ALP SERPL-CCNC: 89 U/L (ref 38–126)
ALT SERPL-CCNC: 30 U/L (ref 21–72)
ANION GAP SERPL CALC-SCNC: 6 MMOL/L
AST SERPL-CCNC: 22 U/L (ref 17–59)
BASOPHILS # BLD AUTO: 0 K/UL (ref 0–0.2)
BASOPHILS NFR BLD AUTO: 0 %
BUN SERPL-SCNC: 16 MG/DL (ref 9–20)
CALCIUM SPEC-MCNC: 8.9 MG/DL (ref 8.4–10.2)
CHLORIDE SERPL-SCNC: 103 MMOL/L (ref 98–107)
CO2 SERPL-SCNC: 29 MMOL/L (ref 22–30)
EOSINOPHIL # BLD AUTO: 0.1 K/UL (ref 0–0.7)
EOSINOPHIL NFR BLD AUTO: 2 %
ERYTHROCYTE [DISTWIDTH] IN BLOOD BY AUTOMATED COUNT: 4.23 M/UL (ref 4.3–5.9)
ERYTHROCYTE [DISTWIDTH] IN BLOOD: 15.7 % (ref 11.5–15.5)
GLUCOSE SERPL-MCNC: 109 MG/DL (ref 74–99)
HCT VFR BLD AUTO: 40.1 % (ref 39–53)
HGB BLD-MCNC: 13 GM/DL (ref 13–17.5)
LYMPHOCYTES # SPEC AUTO: 0.9 K/UL (ref 1–4.8)
LYMPHOCYTES NFR SPEC AUTO: 11 %
MCH RBC QN AUTO: 30.8 PG (ref 25–35)
MCHC RBC AUTO-ENTMCNC: 32.4 G/DL (ref 31–37)
MCV RBC AUTO: 94.9 FL (ref 80–100)
MONOCYTES # BLD AUTO: 0.4 K/UL (ref 0–1)
MONOCYTES NFR BLD AUTO: 5 %
NEUTROPHILS # BLD AUTO: 6.7 K/UL (ref 1.3–7.7)
NEUTROPHILS NFR BLD AUTO: 81 %
PLATELET # BLD AUTO: 125 K/UL (ref 150–450)
POTASSIUM SERPL-SCNC: 4.2 MMOL/L (ref 3.5–5.1)
PROT SERPL-MCNC: 5.7 G/DL (ref 6.3–8.2)
SODIUM SERPL-SCNC: 138 MMOL/L (ref 137–145)
WBC # BLD AUTO: 8.2 K/UL (ref 3.8–10.6)

## 2018-10-02 PROCEDURE — 70450 CT HEAD/BRAIN W/O DYE: CPT

## 2018-10-02 PROCEDURE — 80053 COMPREHEN METABOLIC PANEL: CPT

## 2018-10-02 PROCEDURE — 93005 ELECTROCARDIOGRAM TRACING: CPT

## 2018-10-02 PROCEDURE — 85025 COMPLETE CBC W/AUTO DIFF WBC: CPT

## 2018-10-02 PROCEDURE — 99291 CRITICAL CARE FIRST HOUR: CPT

## 2018-10-02 PROCEDURE — 96374 THER/PROPH/DIAG INJ IV PUSH: CPT

## 2018-10-02 PROCEDURE — 71046 X-RAY EXAM CHEST 2 VIEWS: CPT

## 2018-10-02 PROCEDURE — 96375 TX/PRO/DX INJ NEW DRUG ADDON: CPT

## 2018-10-02 PROCEDURE — 36415 COLL VENOUS BLD VENIPUNCTURE: CPT

## 2018-10-02 NOTE — CT
EXAMINATION TYPE: CT brain wo con

 

DATE OF EXAM: 10/2/2018

 

COMPARISON: 1/16/2017

 

HISTORY: Falls.

 

CT DLP: 1153 mGycm

Automated exposure control for dose reduction was used.

 

FINDINGS: 

There is an area of low attenuation within the left cerebellum and there appears to be a defect along
 the adjacent calvarium suggestive of recent surgery and encephalomalacia. There does appear to be a 
air bubble seen in the area of low attenuation which may be postsurgical although correlate clinicall
y for the date of surgery and possible infection. Lung the upper margin of the area of encephalomalac
ia and low attenuation is a 4 mm of hyperdensity. A tiny petechial hemorrhage is not excluded in the 
case was discussed with the ER physician.

 

There is moderate generalized degenerative change. Periventricular low attenuation is nonspecific but
 most typical remote microvascular ischemia. There is a deformity and cortical buckling along the rig
ht frontal parietal junction of the calvarium. Best noted on image 27. Findings are compatible with a
n age indeterminate fracture with some depression and indentation along the cortical margin on the ri
ght. Case discussed with ER physician.

 

IMPRESSION:

1. Postsurgical change involving the calvarium with an area of low attenuation involving the left cer
ebellar hemisphere likely on the basis of encephalomalacia postoperatively. However, there is a air b
ubble noted within the area of low attenuation. Although this could be postsurgical correlate clinica
lly with the date of surgery to exclude the possibility of infectious etiology.

2. There is a 4 to 5 mm area of  hyperdensity along the upper margin of the low attenuation in the le
ft cerebellar hemisphere. This is too small to characterize but a small acute petechial hemorrhage co
uld not BE excluded.

3. There is a buckling along the right parietal calvarium on image 28 suggestive of a fracture of ind
eterminate age. There is no adjacent soft tissue edema and this may be on a more remote basis. Acute 
injury not excluded correlate clinically. This was not present on the CT scan of 1/16/2017

## 2018-10-02 NOTE — XR
EXAMINATION TYPE: XR chest 2V

 

DATE OF EXAM: 10/2/2018

 

COMPARISON: 5/16/2017 and CT 10/18/2017

 

HISTORY: 73 year-old male shortness of breath, fall, difficulty breathing

 

TECHNIQUE:  Frontal and lateral views

 

FINDINGS:  

Heart normal size. Slight asymmetric elevation of the left hemidiaphragm is unchanged. Increased retr
osternal clear space and mild interstitial prominence. No consolidation or pleural effusion. Possible
 nodularity peripheral left mid to lower lung.

 

Multiple mid and lower thoracic vertebral compression deformities appear to have been present on 10/1
8/2017 CT.

 

 

IMPRESSION:  

 

1. Chronic changes, possible underlying COPD. No acute process seen.

2. Summation artifact versus pulmonary nodule at the peripheral left midlung. Nonemergent follow-up c
ontrast enhanced CT chest recommended to exclude underlying pulmonary nodule.
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
## 2018-10-03 ENCOUNTER — HOSPITAL ENCOUNTER (EMERGENCY)
Dept: HOSPITAL 47 - EC | Age: 73
Discharge: HOME | End: 2018-10-03
Payer: MEDICARE

## 2018-10-03 VITALS
TEMPERATURE: 98.1 F | HEART RATE: 78 BPM | RESPIRATION RATE: 18 BRPM | SYSTOLIC BLOOD PRESSURE: 122 MMHG | DIASTOLIC BLOOD PRESSURE: 59 MMHG

## 2018-10-03 DIAGNOSIS — F17.200: ICD-10-CM

## 2018-10-03 DIAGNOSIS — R05: ICD-10-CM

## 2018-10-03 DIAGNOSIS — Z87.19: ICD-10-CM
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
## 2018-10-03 DIAGNOSIS — N40.0: ICD-10-CM

## 2018-10-03 DIAGNOSIS — Z90.2: ICD-10-CM

## 2018-10-03 DIAGNOSIS — M81.0: ICD-10-CM

## 2018-10-03 DIAGNOSIS — Z85.118: ICD-10-CM

## 2018-10-03 DIAGNOSIS — S22.32XA: Primary | ICD-10-CM

## 2018-10-03 DIAGNOSIS — Z79.899: ICD-10-CM

## 2018-10-03 PROCEDURE — 99283 EMERGENCY DEPT VISIT LOW MDM: CPT

## 2018-10-03 PROCEDURE — 96372 THER/PROPH/DIAG INJ SC/IM: CPT

## 2018-10-03 PROCEDURE — 71046 X-RAY EXAM CHEST 2 VIEWS: CPT

## 2018-10-03 NOTE — XR
EXAMINATION TYPE: XR chest 2V

 

DATE OF EXAM: 10/3/2018

 

COMPARISON: 10/2/2018

 

HISTORY: Short of breath

 

TECHNIQUE:  Frontal and lateral views of the chest are obtained.

 

FINDINGS:  Heart and mediastinum are normal. Lungs are clear of infiltrate. There is anterior wedging
 of a few thoracic vertebra.

 

IMPRESSION:  No active cardiopulmonary disease. Multiple thoracic compression fractures unchanged com
pared to yesterday. No evidence of a left pulmonary nodule or rib fracture.
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
## 2019-01-23 ENCOUNTER — HOSPITAL ENCOUNTER (OUTPATIENT)
Dept: HOSPITAL 47 - RADMRIMAIN | Age: 74
End: 2019-01-23
Attending: NEUROLOGICAL SURGERY
Payer: MEDICARE

## 2019-01-23 DIAGNOSIS — M48.54XA: Primary | ICD-10-CM

## 2019-01-23 PROCEDURE — 72157 MRI CHEST SPINE W/O & W/DYE: CPT

## 2019-01-23 PROCEDURE — 36415 COLL VENOUS BLD VENIPUNCTURE: CPT

## 2019-01-23 PROCEDURE — 82565 ASSAY OF CREATININE: CPT

## 2019-01-24 NOTE — MR
MR scan thoracic spine.

 

History lung cancer. Back pain.

 

Comparison chest CT scan 11/26/2016.

 

TECHNIQUE:

Multiplanar multiecho imaging of the thoracic spine was performed without and with IV contrast. The I
V contrast was gadolinium 7.5 mL. gadavist. 

 

FINDINGS:

There is thoracolumbar kyphotic deformity. There is anterior wedging of T8 and T9 and T10 and T11 and
 T12 up to 70%. There is decreased signal in the T10 vertebral body anteriorly on the T1 images. Ther
e is slight compression deformity of L1 and L2 and L3 up to 25%. I do not see an asymmetric abnormal 
signal pattern in the thoracic vertebra. There is no significant thoracic spinal stenosis. Thoracic s
krunal cord has normal signal pattern. There is no edema. There is some enhancement of the T10 vertebr
al body more likely related to healing subacute fracture. There is no thoracic paraspinal mass. The p
osterior elements are intact.

 

IMPRESSION:

Numerous lower thoracic compression fractures are new compared to old CT scan. I do not see an asymme
tric pattern to suggest metastatic disease. This is more likely related to osteoporotic fractures.

## 2019-02-14 ENCOUNTER — HOSPITAL ENCOUNTER (EMERGENCY)
Dept: HOSPITAL 47 - EC | Age: 74
LOS: 1 days | Discharge: OTHER INSTITUTION | End: 2019-02-15
Payer: MEDICARE

## 2019-02-14 ENCOUNTER — HOSPITAL ENCOUNTER (OUTPATIENT)
Dept: HOSPITAL 47 - RADXRMAIN | Age: 74
End: 2019-02-14
Payer: MEDICARE

## 2019-02-14 VITALS — RESPIRATION RATE: 18 BRPM

## 2019-02-14 DIAGNOSIS — S12.9XXA: Primary | ICD-10-CM

## 2019-02-14 DIAGNOSIS — F17.200: ICD-10-CM

## 2019-02-14 DIAGNOSIS — N40.0: ICD-10-CM

## 2019-02-14 DIAGNOSIS — Z23: ICD-10-CM

## 2019-02-14 DIAGNOSIS — Z79.899: ICD-10-CM

## 2019-02-14 DIAGNOSIS — S22.008S: Primary | ICD-10-CM

## 2019-02-14 DIAGNOSIS — Z93.3: ICD-10-CM
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
## 2019-02-14 DIAGNOSIS — Z93.2: ICD-10-CM

## 2019-02-14 DIAGNOSIS — Z85.118: ICD-10-CM
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
## 2019-02-14 DIAGNOSIS — J44.9: ICD-10-CM

## 2019-02-14 DIAGNOSIS — S00.81XA: ICD-10-CM

## 2019-02-14 PROCEDURE — 80048 BASIC METABOLIC PNL TOTAL CA: CPT

## 2019-02-14 PROCEDURE — 96374 THER/PROPH/DIAG INJ IV PUSH: CPT

## 2019-02-14 PROCEDURE — 90715 TDAP VACCINE 7 YRS/> IM: CPT

## 2019-02-14 PROCEDURE — 85610 PROTHROMBIN TIME: CPT

## 2019-02-14 PROCEDURE — 36415 COLL VENOUS BLD VENIPUNCTURE: CPT

## 2019-02-14 PROCEDURE — 85730 THROMBOPLASTIN TIME PARTIAL: CPT

## 2019-02-14 PROCEDURE — 71046 X-RAY EXAM CHEST 2 VIEWS: CPT

## 2019-02-14 PROCEDURE — 72125 CT NECK SPINE W/O DYE: CPT

## 2019-02-14 PROCEDURE — 70450 CT HEAD/BRAIN W/O DYE: CPT

## 2019-02-14 PROCEDURE — 99285 EMERGENCY DEPT VISIT HI MDM: CPT

## 2019-02-14 PROCEDURE — 90471 IMMUNIZATION ADMIN: CPT

## 2019-02-14 PROCEDURE — 85025 COMPLETE CBC W/AUTO DIFF WBC: CPT
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
## 2019-02-14 NOTE — CT
EXAM:

  CT Head Without Intravenous Contrast

 

CLINICAL HISTORY:

  ITS.REASON CT Reason: Pain

 

TECHNIQUE:

  Axial computed tomography images of the head/brain without intravenous 

contrast.  CTDI is 45 mGy and DLP is 1063mGy-cm.  This CT exam was 

performed using one or more of the following dose reduction techniques: 

automated exposure control, adjustment of the mA and/or kV according to 

patient size, and/or use of iterative reconstruction technique.

 

COMPARISON:

  No relevant prior studies available.

 

FINDINGS:

  Brain:  No hemorrhage.  No edema.

  Ventricles:  Unremarkable.  No ventriculomegaly.

  Bones/joints:  No acute fracture.

  Soft tissues:  Unremarkable.

  Sinuses:  No fluid levels.

  Mastoid air cells:  Unremarkable as visualized.  No mastoid effusion.

 

IMPRESSION:     

  No acute intracranial findings

 

_______________________________________________

 

EXAM:

  CT Cervical Spine Without Intravenous Contrast

 

CLINICAL HISTORY:

  ITS.REASON CT Reason: Pain

 

TECHNIQUE:

  Axial computed tomography images of the cervical spine without 

intravenous contrast.  CTDI is 9.4 mGy and DLP is 232 mGy-cm.  This CT 

exam was performed using one or more of the following dose reduction 

techniques: automated exposure control, adjustment of the mA and/or kV 

according to patient size, and/or use of iterative reconstruction 

technique.

 

COMPARISON:

  No relevant prior studies available.

 

FINDINGS:

  Vertebrae:  Type II odontoid process fracture with 3 mm distraction of 

the 2 major fracture fragments.

  Discs/spinal canal/neural foramina:  No suspicious findings.

  Soft tissues:  Unremarkable.

 

IMPRESSION:     

  Type II odontoid process fracture with 3 mm distraction of the 2 major 

fracture fragments.

 

 

 

[employer]

 

02/14/19 23:15 Verify Receipt Verified receipt with unit clerk Adamson, 

given to  Dr. Caldwell on 02/14 23:14 (-05:00)

## 2019-02-15 VITALS — SYSTOLIC BLOOD PRESSURE: 137 MMHG | TEMPERATURE: 98.6 F | HEART RATE: 71 BPM | DIASTOLIC BLOOD PRESSURE: 87 MMHG

## 2019-02-15 LAB
ANION GAP SERPL CALC-SCNC: 5 MMOL/L
APTT BLD: 25.3 SEC (ref 22–30)
BASOPHILS # BLD AUTO: 0 K/UL (ref 0–0.2)
BASOPHILS NFR BLD AUTO: 0 %
BUN SERPL-SCNC: 13 MG/DL (ref 9–20)
CALCIUM SPEC-MCNC: 9.3 MG/DL (ref 8.4–10.2)
CHLORIDE SERPL-SCNC: 106 MMOL/L (ref 98–107)
CO2 SERPL-SCNC: 25 MMOL/L (ref 22–30)
EOSINOPHIL # BLD AUTO: 0.2 K/UL (ref 0–0.7)
EOSINOPHIL NFR BLD AUTO: 2 %
ERYTHROCYTE [DISTWIDTH] IN BLOOD BY AUTOMATED COUNT: 4.8 M/UL (ref 4.3–5.9)
ERYTHROCYTE [DISTWIDTH] IN BLOOD: 13.7 % (ref 11.5–15.5)
GLUCOSE SERPL-MCNC: 110 MG/DL (ref 74–99)
HCT VFR BLD AUTO: 45.1 % (ref 39–53)
HGB BLD-MCNC: 14.8 GM/DL (ref 13–17.5)
INR PPP: 1 (ref ?–1.2)
LYMPHOCYTES # SPEC AUTO: 1.6 K/UL (ref 1–4.8)
LYMPHOCYTES NFR SPEC AUTO: 15 %
MCH RBC QN AUTO: 30.8 PG (ref 25–35)
MCHC RBC AUTO-ENTMCNC: 32.8 G/DL (ref 31–37)
MCV RBC AUTO: 93.8 FL (ref 80–100)
MONOCYTES # BLD AUTO: 0.7 K/UL (ref 0–1)
MONOCYTES NFR BLD AUTO: 6 %
NEUTROPHILS # BLD AUTO: 8.2 K/UL (ref 1.3–7.7)
NEUTROPHILS NFR BLD AUTO: 75 %
PLATELET # BLD AUTO: 207 K/UL (ref 150–450)
POTASSIUM SERPL-SCNC: 4.4 MMOL/L (ref 3.5–5.1)
PT BLD: 10.3 SEC (ref 9–12)
SODIUM SERPL-SCNC: 136 MMOL/L (ref 137–145)
WBC # BLD AUTO: 10.9 K/UL (ref 3.8–10.6)

## 2019-06-21 ENCOUNTER — HOSPITAL ENCOUNTER (OUTPATIENT)
Dept: HOSPITAL 47 - RADCTMAIN | Age: 74
Discharge: HOME | End: 2019-06-21
Attending: NEUROLOGICAL SURGERY
Payer: MEDICARE

## 2019-06-21 DIAGNOSIS — S12.100K: Primary | ICD-10-CM

## 2019-06-21 PROCEDURE — 72125 CT NECK SPINE W/O DYE: CPT

## 2019-06-22 NOTE — CT
EXAMINATION TYPE: CT cervical spine wo con

 

DATE OF EXAM: 6/21/2019

 

COMPARISON: 2/14/2019

 

HISTORY: f/u C2 fx

 

CT DLP: 373 mGycm

 

Unenhanced CT of the cervical spine was performed with bone and soft tissue window settings submitted
.  Coronal and sagittal reconstruction is obtained.

 

Noted is screw fixation of the previously described C2 fracture. There is no evidence for callus form
ation at this time. 3 mm posterior displacement of proximal fracture component noted. This is essenti
ally unchanged relative to previous study. As exaggerated cervical lordosis. Degenerative narrowing i
s seen throughout all levels greatest at C7-T1 with vacuum disks noted. No evidence for disc herniati
on or central stenosis. Scattered ventral spondylosis identified.

 

IMPRESSION: Noted is screw fixation of the previously described C2 fracture. There is no evidence for
 callus formation at this time. 3 mm posterior displacement of proximal fracture component noted.

## 2019-07-19 ENCOUNTER — HOSPITAL ENCOUNTER (OUTPATIENT)
Dept: HOSPITAL 47 - RADCTMAIN | Age: 74
Discharge: HOME | End: 2019-07-19
Attending: NEUROLOGICAL SURGERY
Payer: MEDICARE

## 2019-07-19 DIAGNOSIS — S22.008S: Primary | ICD-10-CM

## 2019-07-19 DIAGNOSIS — R91.8: ICD-10-CM

## 2019-07-19 PROCEDURE — 71250 CT THORAX DX C-: CPT

## 2019-11-20 NOTE — XR
EXAMINATION TYPE: XR chest 2V

 

DATE OF EXAM: 2/14/2019

 

COMPARISON: 10/3/2018

 

INDICATION: Presurgical evaluation

 

TECHNIQUE:  Frontal and lateral views of the chest are obtained.

 

FINDINGS:  

The heart size is normal.  

The pulmonary vasculature is normal.

The lungs are clear.  

 

Compression deformities within the mid and lower thoracic spine, slightly progressive from October 20
18.

 

IMPRESSION:  

1. No acute pulmonary process.

2. There appears to be some mild progression of multilevel compression deformities predominantly with
in the mid thoracic spine. Private car

## 2019-12-18 ENCOUNTER — HOSPITAL ENCOUNTER (OUTPATIENT)
Dept: HOSPITAL 47 - RADXRMAIN | Age: 74
Discharge: HOME | End: 2019-12-18
Payer: MEDICARE

## 2019-12-18 DIAGNOSIS — M81.0: ICD-10-CM

## 2019-12-18 DIAGNOSIS — S22.070S: ICD-10-CM

## 2019-12-18 DIAGNOSIS — S22.080S: Primary | ICD-10-CM

## 2019-12-18 DIAGNOSIS — M41.84: ICD-10-CM

## 2019-12-18 DIAGNOSIS — S22.050S: ICD-10-CM

## 2019-12-18 DIAGNOSIS — S22.060S: ICD-10-CM

## 2019-12-18 PROCEDURE — 72100 X-RAY EXAM L-S SPINE 2/3 VWS: CPT

## 2019-12-18 PROCEDURE — 72072 X-RAY EXAM THORAC SPINE 3VWS: CPT

## 2019-12-18 NOTE — XR
EXAMINATION TYPE: XR thoracic spine complete

 

DATE OF EXAM: 12/18/2019

 

CLINICAL HISTORY: Compression fracture, pain from fall

 

TECHNIQUE: Frontal, lateral, and swimmer's view of thoracic spine are obtained.  

 

COMPARISON: CT chest July 19, 2019

 

FINDINGS: Osseous structures remain demineralized which is noted to lower radiographic sensitivity. D
extroconvex scoliosis centered in the thoracic spine is redemonstrated. Tuboplasty noted T11 level at
 mild-to-moderate compression type fracture deformity. There are additional mild to moderate compress
ion fracture deformities involving T8-T10 vertebra and T12 vertebra. Mild chronic compression fractur
e deformity also seen T6 level. Some exaggerated kyphosis redemonstrated. No retropulsion or definiti
ve linear lucency to suggest acute fracture.   Calcified subcarinal lymph nodes or granulomas inciden
tally noted.

 

IMPRESSION: As above.

## 2019-12-18 NOTE — XR
EXAMINATION TYPE: XR lumbar spine 2 or 3V

 

DATE OF EXAM: 12/18/2019

 

CLINICAL HISTORY: Pain from fall injury.

 

TECHNIQUE: Frontal and lateral images of the lumbar spine are obtained.

 

COMPARISON: CT October 18, 2017. Lumbar spine x-ray May 23, 2017.

 

FINDINGS:  Osseous structures are demineralized which is noted to lower radiographic sensitivity. The
re are 5 lumbar type vertebral bodies thought present. Chronic mild compression fracture L2 level.New
 but suspected chronic compression fractures L1, L3, and L4 superior endplates. Stable mild chronic c
ompression fracture T11 and T12 levels. Mild-to-moderate compression fractures T10 and T9 level with 
vertebroplasty T10 level. Overlying ostomy may be present.

 

IMPRESSION: Demineralization with multiple suspected chronic compression type fracture deformities. A
lignment is maintained. Progression from 2017 noted.

## 2019-12-26 ENCOUNTER — HOSPITAL ENCOUNTER (OUTPATIENT)
Dept: HOSPITAL 47 - RADNMMAIN | Age: 74
Discharge: HOME | End: 2019-12-26
Payer: MEDICARE

## 2019-12-26 DIAGNOSIS — M80.80XA: Primary | ICD-10-CM

## 2019-12-26 PROCEDURE — 78306 BONE IMAGING WHOLE BODY: CPT

## 2019-12-26 NOTE — NM
EXAMINATION TYPE: NM bone scan whole body

 

DATE OF EXAM: 12/26/2019

 

COMPARISON: Plain film 12/18/2019, CT chest 7/19/2019

 

HISTORY: Disc degeneration lumbar region, low back pain, Compression fracture

 

Delayed whole-body scanning was performed following the injection of 24.6 mCi Tc 99m MDP.  Images acq
uired 3 hours post injection.

 

FINDINGS: 

 

Multiple bandlike areas of increased radio pharmaceutical uptake are present throughout the lumbar sp
ine, thoracic spine consistent with osteoporotic compression fractures as noted on plain film involvi
ng likely L3, L5, T12, T11, L1 of varying ages and also within multiple thoracic vertebral bodies, mo
re intense uptake is present at what is likely T12, L3 and L5. Soft tissue uptake is normal. There is
 uptake along the anterior seventh rib on the right which may be posttraumatic. There is uptake in th
e sternomanubrial joint which is likely degenerative.

 

IMPRESSION: 

 

Multiple osteoporotic compression fractures of varying ages. Correlate for history of trauma to anter
ior seventh rib.

## 2020-02-03 ENCOUNTER — HOSPITAL ENCOUNTER (OUTPATIENT)
Dept: HOSPITAL 47 - PROCWHC3 | Age: 75
Discharge: HOME | End: 2020-02-03
Attending: FAMILY MEDICINE
Payer: MEDICARE

## 2020-02-03 VITALS
HEART RATE: 81 BPM | SYSTOLIC BLOOD PRESSURE: 104 MMHG | RESPIRATION RATE: 16 BRPM | DIASTOLIC BLOOD PRESSURE: 60 MMHG | TEMPERATURE: 97.6 F

## 2020-02-03 DIAGNOSIS — C34.90: Primary | ICD-10-CM

## 2020-02-03 DIAGNOSIS — M89.9: ICD-10-CM

## 2020-02-03 PROCEDURE — 96372 THER/PROPH/DIAG INJ SC/IM: CPT

## 2020-09-06 ENCOUNTER — HOSPITAL ENCOUNTER (EMERGENCY)
Dept: HOSPITAL 47 - EC | Age: 75
Discharge: HOME | End: 2020-09-06
Payer: MEDICARE

## 2020-09-06 VITALS — SYSTOLIC BLOOD PRESSURE: 104 MMHG | DIASTOLIC BLOOD PRESSURE: 72 MMHG

## 2020-09-06 VITALS — TEMPERATURE: 99.2 F | RESPIRATION RATE: 16 BRPM | HEART RATE: 105 BPM

## 2020-09-06 DIAGNOSIS — Z93.3: ICD-10-CM

## 2020-09-06 DIAGNOSIS — N39.0: Primary | ICD-10-CM

## 2020-09-06 DIAGNOSIS — R53.1: ICD-10-CM

## 2020-09-06 DIAGNOSIS — Z85.118: ICD-10-CM

## 2020-09-06 DIAGNOSIS — Z90.2: ICD-10-CM

## 2020-09-06 DIAGNOSIS — M81.0: ICD-10-CM

## 2020-09-06 DIAGNOSIS — R53.83: ICD-10-CM

## 2020-09-06 DIAGNOSIS — F17.200: ICD-10-CM

## 2020-09-06 DIAGNOSIS — Z79.899: ICD-10-CM

## 2020-09-06 DIAGNOSIS — N40.0: ICD-10-CM

## 2020-09-06 DIAGNOSIS — Z87.19: ICD-10-CM

## 2020-09-06 LAB
ALBUMIN SERPL-MCNC: 3 G/DL (ref 3.5–5)
ALP SERPL-CCNC: 85 U/L (ref 38–126)
ALT SERPL-CCNC: 15 U/L (ref 4–49)
ANION GAP SERPL CALC-SCNC: 7 MMOL/L
APTT BLD: 23.2 SEC (ref 22–30)
AST SERPL-CCNC: 38 U/L (ref 17–59)
BASOPHILS # BLD AUTO: 0.1 K/UL (ref 0–0.2)
BASOPHILS NFR BLD AUTO: 1 %
BUN SERPL-SCNC: 10 MG/DL (ref 9–20)
CALCIUM SPEC-MCNC: 8.7 MG/DL (ref 8.4–10.2)
CHLORIDE SERPL-SCNC: 109 MMOL/L (ref 98–107)
CO2 SERPL-SCNC: 19 MMOL/L (ref 22–30)
EOSINOPHIL # BLD AUTO: 0.3 K/UL (ref 0–0.7)
EOSINOPHIL NFR BLD AUTO: 3 %
ERYTHROCYTE [DISTWIDTH] IN BLOOD BY AUTOMATED COUNT: 3.01 M/UL (ref 4.3–5.9)
ERYTHROCYTE [DISTWIDTH] IN BLOOD: 15.6 % (ref 11.5–15.5)
GLUCOSE SERPL-MCNC: 130 MG/DL (ref 74–99)
HCT VFR BLD AUTO: 29.7 % (ref 39–53)
HGB BLD-MCNC: 9.3 GM/DL (ref 13–17.5)
INR PPP: 1.1 (ref ?–1.2)
LYMPHOCYTES # SPEC AUTO: 1.8 K/UL (ref 1–4.8)
LYMPHOCYTES NFR SPEC AUTO: 18 %
MCH RBC QN AUTO: 30.9 PG (ref 25–35)
MCHC RBC AUTO-ENTMCNC: 31.2 G/DL (ref 31–37)
MCV RBC AUTO: 98.9 FL (ref 80–100)
MONOCYTES # BLD AUTO: 0.5 K/UL (ref 0–1)
MONOCYTES NFR BLD AUTO: 5 %
NEUTROPHILS # BLD AUTO: 7.2 K/UL (ref 1.3–7.7)
NEUTROPHILS NFR BLD AUTO: 72 %
PH UR: 6 [PH] (ref 5–8)
PLATELET # BLD AUTO: 203 K/UL (ref 150–450)
POTASSIUM SERPL-SCNC: 4.5 MMOL/L (ref 3.5–5.1)
PROT SERPL-MCNC: 5.9 G/DL (ref 6.3–8.2)
PROT UR QL: (no result)
PT BLD: 10.8 SEC (ref 9–12)
RBC UR QL: 25 /HPF (ref 0–5)
SODIUM SERPL-SCNC: 135 MMOL/L (ref 137–145)
SP GR UR: 1.01 (ref 1–1.03)
UROBILINOGEN UR QL STRIP: <2 MG/DL (ref ?–2)
WBC # BLD AUTO: 10 K/UL (ref 3.8–10.6)
WBC # UR AUTO: >182 /HPF (ref 0–5)

## 2020-09-06 PROCEDURE — 85610 PROTHROMBIN TIME: CPT

## 2020-09-06 PROCEDURE — 36415 COLL VENOUS BLD VENIPUNCTURE: CPT

## 2020-09-06 PROCEDURE — 99285 EMERGENCY DEPT VISIT HI MDM: CPT

## 2020-09-06 PROCEDURE — 87040 BLOOD CULTURE FOR BACTERIA: CPT

## 2020-09-06 PROCEDURE — 93005 ELECTROCARDIOGRAM TRACING: CPT

## 2020-09-06 PROCEDURE — 71046 X-RAY EXAM CHEST 2 VIEWS: CPT

## 2020-09-06 PROCEDURE — 80053 COMPREHEN METABOLIC PANEL: CPT

## 2020-09-06 PROCEDURE — 96374 THER/PROPH/DIAG INJ IV PUSH: CPT

## 2020-09-06 PROCEDURE — 87086 URINE CULTURE/COLONY COUNT: CPT

## 2020-09-06 PROCEDURE — 84484 ASSAY OF TROPONIN QUANT: CPT

## 2020-09-06 PROCEDURE — 85730 THROMBOPLASTIN TIME PARTIAL: CPT

## 2020-09-06 PROCEDURE — 85025 COMPLETE CBC W/AUTO DIFF WBC: CPT

## 2020-09-06 PROCEDURE — 83605 ASSAY OF LACTIC ACID: CPT

## 2020-09-06 PROCEDURE — 81001 URINALYSIS AUTO W/SCOPE: CPT
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
## 2020-09-06 NOTE — XR
EXAMINATION TYPE: XR chest 2V

 

DATE OF EXAM: 9/6/2020

 

CLINICAL HISTORY: Fever 

 

TECHNIQUE:  Frontal and lateral views of the chest are obtained.

 

COMPARISON: Chest radiograph 2/14/2019

 

FINDINGS:  The cardiomediastinal silhouette is within normal limits for size. Pulmonary vasculature i
s normal. There is no focal air space opacity, pleural effusion, or pneumothorax seen. There are mult
iple redemonstrated compression deformities of the thoracic spine, as well as kyphoplasty change.

 

IMPRESSION:  No acute cardiopulmonary process.